# Patient Record
Sex: FEMALE | Race: WHITE | NOT HISPANIC OR LATINO | URBAN - METROPOLITAN AREA
[De-identification: names, ages, dates, MRNs, and addresses within clinical notes are randomized per-mention and may not be internally consistent; named-entity substitution may affect disease eponyms.]

---

## 2018-08-14 ENCOUNTER — APPOINTMENT (RX ONLY)
Dept: URBAN - METROPOLITAN AREA CLINIC 31 | Facility: CLINIC | Age: 73
Setting detail: DERMATOLOGY
End: 2018-08-14

## 2018-08-14 DIAGNOSIS — D485 NEOPLASM OF UNCERTAIN BEHAVIOR OF SKIN: ICD-10-CM

## 2018-08-14 DIAGNOSIS — L72.0 EPIDERMAL CYST: ICD-10-CM

## 2018-08-14 DIAGNOSIS — D18.0 HEMANGIOMA: ICD-10-CM

## 2018-08-14 DIAGNOSIS — Z71.89 OTHER SPECIFIED COUNSELING: ICD-10-CM

## 2018-08-14 DIAGNOSIS — L82.1 OTHER SEBORRHEIC KERATOSIS: ICD-10-CM

## 2018-08-14 DIAGNOSIS — D22 MELANOCYTIC NEVI: ICD-10-CM

## 2018-08-14 DIAGNOSIS — L81.4 OTHER MELANIN HYPERPIGMENTATION: ICD-10-CM

## 2018-08-14 PROBLEM — D22.5 MELANOCYTIC NEVI OF TRUNK: Status: ACTIVE | Noted: 2018-08-14

## 2018-08-14 PROBLEM — D18.01 HEMANGIOMA OF SKIN AND SUBCUTANEOUS TISSUE: Status: ACTIVE | Noted: 2018-08-14

## 2018-08-14 PROBLEM — D48.5 NEOPLASM OF UNCERTAIN BEHAVIOR OF SKIN: Status: ACTIVE | Noted: 2018-08-14

## 2018-08-14 PROCEDURE — ? COUNSELING

## 2018-08-14 PROCEDURE — ? LIQUID NITROGEN (COSMETIC)

## 2018-08-14 PROCEDURE — ? OBSERVATION AND MEASURE

## 2018-08-14 PROCEDURE — ? ADDITIONAL NOTES

## 2018-08-14 PROCEDURE — 99213 OFFICE O/P EST LOW 20 MIN: CPT

## 2018-08-14 ASSESSMENT — LOCATION SIMPLE DESCRIPTION DERM
LOCATION SIMPLE: LEFT UPPER BACK
LOCATION SIMPLE: LEFT SHOULDER
LOCATION SIMPLE: LEFT SUPERIOR EYELID
LOCATION SIMPLE: RIGHT SEPTUM
LOCATION SIMPLE: ABDOMEN

## 2018-08-14 ASSESSMENT — LOCATION DETAILED DESCRIPTION DERM
LOCATION DETAILED: RIGHT MID-ANTERIOR SEPTUM
LOCATION DETAILED: LEFT MEDIAL SUPERIOR EYELID
LOCATION DETAILED: LEFT MEDIAL UPPER BACK
LOCATION DETAILED: EPIGASTRIC SKIN
LOCATION DETAILED: LEFT RIB CAGE
LOCATION DETAILED: LEFT ANTERIOR SHOULDER

## 2018-08-14 ASSESSMENT — LOCATION ZONE DERM
LOCATION ZONE: ARM
LOCATION ZONE: NASAL_CAVITY
LOCATION ZONE: EYELID
LOCATION ZONE: TRUNK

## 2018-08-14 NOTE — PROCEDURE: LIQUID NITROGEN (COSMETIC)
Post-Care Instructions: I reviewed with the patient in detail post-care instructions. Patient is to wear sunprotection, and avoid picking at any of the treated lesions. Pt may apply Vaseline to crusted or scabbing areas.
Detail Level: Detailed
Consent: The patient's consent was obtained including but not limited to risks of crusting, scabbing, blistering, scarring, darker or lighter pigmentary change, recurrence, incomplete removal and infection. The patient understands that the procedure is cosmetic in nature and is not covered by insurance.
Render Post-Care Instructions In Note?: yes
Price (Use Numbers Only, No Special Characters Or $): 50.00

## 2018-08-14 NOTE — HPI: SKIN LESION
Is This A New Presentation, Or A Follow-Up?: Growths
How Severe Is Your Skin Lesion?: mild
Has Your Skin Lesion Been Treated?: not been treated
Is This A New Presentation, Or A Follow-Up?: Skin Lesion
Additional History: She feels like it is interferring with her breathing.  She has applied various ointments to it, including antibiotic ointment with no improvement.

## 2019-10-10 ENCOUNTER — APPOINTMENT (RX ONLY)
Dept: URBAN - METROPOLITAN AREA CLINIC 31 | Facility: CLINIC | Age: 74
Setting detail: DERMATOLOGY
End: 2019-10-10

## 2019-10-10 DIAGNOSIS — Z71.89 OTHER SPECIFIED COUNSELING: ICD-10-CM

## 2019-10-10 DIAGNOSIS — L57.8 OTHER SKIN CHANGES DUE TO CHRONIC EXPOSURE TO NONIONIZING RADIATION: ICD-10-CM

## 2019-10-10 DIAGNOSIS — L81.4 OTHER MELANIN HYPERPIGMENTATION: ICD-10-CM

## 2019-10-10 DIAGNOSIS — D18.0 HEMANGIOMA: ICD-10-CM

## 2019-10-10 DIAGNOSIS — D22 MELANOCYTIC NEVI: ICD-10-CM

## 2019-10-10 DIAGNOSIS — L82.1 OTHER SEBORRHEIC KERATOSIS: ICD-10-CM

## 2019-10-10 PROBLEM — D18.01 HEMANGIOMA OF SKIN AND SUBCUTANEOUS TISSUE: Status: ACTIVE | Noted: 2019-10-10

## 2019-10-10 PROBLEM — D48.5 NEOPLASM OF UNCERTAIN BEHAVIOR OF SKIN: Status: ACTIVE | Noted: 2019-10-10

## 2019-10-10 PROBLEM — D22.5 MELANOCYTIC NEVI OF TRUNK: Status: ACTIVE | Noted: 2019-10-10

## 2019-10-10 PROCEDURE — ? COUNSELING

## 2019-10-10 PROCEDURE — 11102 TANGNTL BX SKIN SINGLE LES: CPT

## 2019-10-10 PROCEDURE — ? BIOPSY BY SHAVE METHOD

## 2019-10-10 PROCEDURE — 99213 OFFICE O/P EST LOW 20 MIN: CPT | Mod: 25

## 2019-10-10 ASSESSMENT — LOCATION ZONE DERM
LOCATION ZONE: ARM
LOCATION ZONE: LEG
LOCATION ZONE: TRUNK

## 2019-10-10 ASSESSMENT — LOCATION DETAILED DESCRIPTION DERM
LOCATION DETAILED: RIGHT PROXIMAL POSTERIOR UPPER ARM
LOCATION DETAILED: LEFT MEDIAL UPPER BACK
LOCATION DETAILED: RIGHT ANTERIOR PROXIMAL THIGH
LOCATION DETAILED: LEFT PROXIMAL POSTERIOR UPPER ARM
LOCATION DETAILED: LEFT MEDIAL SUPERIOR CHEST
LOCATION DETAILED: LEFT RIB CAGE
LOCATION DETAILED: PERIUMBILICAL SKIN
LOCATION DETAILED: EPIGASTRIC SKIN
LOCATION DETAILED: RIGHT MEDIAL SUPERIOR CHEST

## 2019-10-10 ASSESSMENT — LOCATION SIMPLE DESCRIPTION DERM
LOCATION SIMPLE: LEFT POSTERIOR UPPER ARM
LOCATION SIMPLE: LEFT UPPER BACK
LOCATION SIMPLE: CHEST
LOCATION SIMPLE: ABDOMEN
LOCATION SIMPLE: RIGHT POSTERIOR UPPER ARM
LOCATION SIMPLE: RIGHT THIGH

## 2019-10-10 NOTE — HPI: FULL BODY SKIN EXAMINATION
How Severe Are Your Spot(S)?: mild
What Is The Reason For Today's Visit?: Full Body Skin Examination with No Concerns
What Is The Reason For Today's Visit? (Being Monitored For X): concerning skin lesions on an annual basis
Additional History: Patient has not noticed any changing moles or any tender or bleeding spots. Pt saw ENT for growth in her nose that was mentioned at her last office visit and was given a medication to treat the spot.

## 2019-10-10 NOTE — PROCEDURE: BIOPSY BY SHAVE METHOD
Electrodesiccation Text: The wound bed was treated with electrodesiccation after the biopsy was performed.
Type Of Destruction Used: Curettage
Bill For Surgical Tray: no
Was A Bandage Applied: Yes
Anesthesia Volume In Cc: 0.5
Notification Instructions: Patient will be notified of biopsy results. However, patient instructed to call the office if not contacted within 2 weeks.
Biopsy Method: Personna blade
Lab Facility: 
Dressing: bandage
Detail Level: Detailed
Cryotherapy Text: The wound bed was treated with cryotherapy after the biopsy was performed.
Post-Care Instructions: I reviewed with the patient in detail post-care instructions. Patient is to keep the biopsy site dry overnight, and then apply vaseline twice daily until healed.
Hemostasis: Drysol and Electrocautery
Lab: [id number]
Curettage Text: The wound bed was treated with curettage after the biopsy was performed.
Billing Type: Third-Party Bill
Size Of Lesion In Cm: 0.9
Silver Nitrate Text: The wound bed was treated with silver nitrate after the biopsy was performed.
Depth Of Biopsy: dermis
X Size Of Lesion In Cm: 0
Anesthesia Type: 1% lidocaine with epinephrine
Biopsy Type: H and E
Wound Care: Vaseline
Electrodesiccation And Curettage Text: The wound bed was treated with electrodesiccation and curettage after the biopsy was performed.
Consent: Written consent was obtained and risks were reviewed including but not limited to scarring, infection, bleeding, scabbing, incomplete removal, nerve damage and allergy to anesthesia.

## 2020-10-15 ENCOUNTER — APPOINTMENT (RX ONLY)
Dept: URBAN - METROPOLITAN AREA CLINIC 31 | Facility: CLINIC | Age: 75
Setting detail: DERMATOLOGY
End: 2020-10-15

## 2020-10-15 VITALS — TEMPERATURE: 97.7 F

## 2020-10-15 DIAGNOSIS — L81.4 OTHER MELANIN HYPERPIGMENTATION: ICD-10-CM

## 2020-10-15 DIAGNOSIS — D18.0 HEMANGIOMA: ICD-10-CM

## 2020-10-15 DIAGNOSIS — L82.1 OTHER SEBORRHEIC KERATOSIS: ICD-10-CM

## 2020-10-15 DIAGNOSIS — D22 MELANOCYTIC NEVI: ICD-10-CM

## 2020-10-15 DIAGNOSIS — Z71.89 OTHER SPECIFIED COUNSELING: ICD-10-CM

## 2020-10-15 PROBLEM — D48.5 NEOPLASM OF UNCERTAIN BEHAVIOR OF SKIN: Status: ACTIVE | Noted: 2020-10-15

## 2020-10-15 PROBLEM — D18.01 HEMANGIOMA OF SKIN AND SUBCUTANEOUS TISSUE: Status: ACTIVE | Noted: 2020-10-15

## 2020-10-15 PROBLEM — D22.5 MELANOCYTIC NEVI OF TRUNK: Status: ACTIVE | Noted: 2020-10-15

## 2020-10-15 PROCEDURE — ? BIOPSY BY SHAVE METHOD

## 2020-10-15 PROCEDURE — ? OBSERVATION AND MEASURE

## 2020-10-15 PROCEDURE — ? COUNSELING

## 2020-10-15 PROCEDURE — 11102 TANGNTL BX SKIN SINGLE LES: CPT

## 2020-10-15 PROCEDURE — 99213 OFFICE O/P EST LOW 20 MIN: CPT | Mod: 25

## 2020-10-15 PROCEDURE — ? ADDITIONAL NOTES

## 2020-10-15 ASSESSMENT — LOCATION SIMPLE DESCRIPTION DERM
LOCATION SIMPLE: RIGHT SHOULDER
LOCATION SIMPLE: LEFT SCALP
LOCATION SIMPLE: RIGHT UPPER ARM
LOCATION SIMPLE: ABDOMEN
LOCATION SIMPLE: LEFT UPPER BACK

## 2020-10-15 ASSESSMENT — LOCATION DETAILED DESCRIPTION DERM
LOCATION DETAILED: LEFT RIB CAGE
LOCATION DETAILED: EPIGASTRIC SKIN
LOCATION DETAILED: LEFT MEDIAL FRONTAL SCALP
LOCATION DETAILED: RIGHT ANTERIOR SHOULDER
LOCATION DETAILED: LEFT MEDIAL UPPER BACK
LOCATION DETAILED: RIGHT ANTERIOR PROXIMAL UPPER ARM

## 2020-10-15 ASSESSMENT — LOCATION ZONE DERM
LOCATION ZONE: ARM
LOCATION ZONE: SCALP
LOCATION ZONE: TRUNK

## 2020-10-15 NOTE — PROCEDURE: ADDITIONAL NOTES
Detail Level: Simple
Additional Notes: treated with LN2 per pt request to right clavicle spot on intake.

## 2020-10-15 NOTE — PROCEDURE: BIOPSY BY SHAVE METHOD
Detail Level: Detailed
Depth Of Biopsy: dermis
Was A Bandage Applied: Yes
Size Of Lesion In Cm: 1.7
X Size Of Lesion In Cm: 0
Biopsy Type: H and E
Biopsy Method: Personna blade
Anesthesia Type: 1% lidocaine with epinephrine
Anesthesia Volume In Cc: 0.5
Hemostasis: Drysol and Electrocautery
Wound Care: Vaseline
Dressing: bandage
Destruction After The Procedure: No
Type Of Destruction Used: Curettage
Curettage Text: The wound bed was treated with curettage after the biopsy was performed.
Cryotherapy Text: The wound bed was treated with cryotherapy after the biopsy was performed.
Electrodesiccation Text: The wound bed was treated with electrodesiccation after the biopsy was performed.
Electrodesiccation And Curettage Text: The wound bed was treated with electrodesiccation and curettage after the biopsy was performed.
Silver Nitrate Text: The wound bed was treated with silver nitrate after the biopsy was performed.
Lab: [id number]
Lab Facility: 
Consent: Written consent was obtained and risks were reviewed including but not limited to scarring, infection, bleeding, scabbing, incomplete removal, nerve damage and allergy to anesthesia.
Post-Care Instructions: I reviewed with the patient in detail post-care instructions. Patient is to keep the biopsy site dry overnight, and then apply vaseline twice daily until healed.
Notification Instructions: Patient will be notified of biopsy results. However, patient instructed to call the office if not contacted within 2 weeks.
Billing Type: Third-Party Bill
Information: Selecting Yes will display possible errors in your note based on the variables you have selected. This validation is only offered as a suggestion for you. PLEASE NOTE THAT THE VALIDATION TEXT WILL BE REMOVED WHEN YOU FINALIZE YOUR NOTE. IF YOU WANT TO FAX A PRELIMINARY NOTE YOU WILL NEED TO TOGGLE THIS TO 'NO' IF YOU DO NOT WANT IT IN YOUR FAXED NOTE.

## 2020-10-15 NOTE — HPI: FULL BODY SKIN EXAMINATION
How Severe Are Your Spot(S)?: mild
What Is The Reason For Today's Visit?: Full Body Skin Examination
What Is The Reason For Today's Visit? (Being Monitored For X): concerning skin lesions on an annual basis
Additional History: Patient denies any changing moles or any tender bleeding spots.

## 2021-01-18 ENCOUNTER — APPOINTMENT (RX ONLY)
Dept: URBAN - METROPOLITAN AREA CLINIC 31 | Facility: CLINIC | Age: 76
Setting detail: DERMATOLOGY
End: 2021-01-18

## 2021-01-18 VITALS — TEMPERATURE: 97.2 F

## 2021-01-18 DIAGNOSIS — L81.4 OTHER MELANIN HYPERPIGMENTATION: ICD-10-CM

## 2021-01-18 DIAGNOSIS — L82.1 OTHER SEBORRHEIC KERATOSIS: ICD-10-CM

## 2021-01-18 PROBLEM — D48.5 NEOPLASM OF UNCERTAIN BEHAVIOR OF SKIN: Status: ACTIVE | Noted: 2021-01-18

## 2021-01-18 PROCEDURE — 99212 OFFICE O/P EST SF 10 MIN: CPT | Mod: 25

## 2021-01-18 PROCEDURE — ? OBSERVATION AND MEASURE

## 2021-01-18 PROCEDURE — ? BIOPSY BY SHAVE METHOD

## 2021-01-18 PROCEDURE — 11102 TANGNTL BX SKIN SINGLE LES: CPT

## 2021-01-18 PROCEDURE — ? ADDITIONAL NOTES

## 2021-01-18 PROCEDURE — ? COUNSELING

## 2021-01-18 ASSESSMENT — LOCATION ZONE DERM
LOCATION ZONE: NECK
LOCATION ZONE: ARM

## 2021-01-18 ASSESSMENT — LOCATION DETAILED DESCRIPTION DERM
LOCATION DETAILED: RIGHT ANTERIOR PROXIMAL UPPER ARM
LOCATION DETAILED: LEFT CLAVICULAR NECK

## 2021-01-18 ASSESSMENT — LOCATION SIMPLE DESCRIPTION DERM
LOCATION SIMPLE: LEFT ANTERIOR NECK
LOCATION SIMPLE: RIGHT UPPER ARM

## 2021-01-18 NOTE — PROCEDURE: ADDITIONAL NOTES
Detail Level: Simple
Render Risk Assessment In Note?: no
Additional Notes: Spot mentioned on intake, patient advised it does not appear suspicious for skin cancer. return to clinic if changes or becomes bothersome.
Additional Notes: treated with LN2 per pt request

## 2021-01-18 NOTE — PROCEDURE: BIOPSY BY SHAVE METHOD
Detail Level: Detailed
Depth Of Biopsy: dermis
Was A Bandage Applied: Yes
Size Of Lesion In Cm: 1.2
X Size Of Lesion In Cm: 0
Biopsy Type: H and E
Biopsy Method: Personna blade
Anesthesia Type: 1% lidocaine with epinephrine
Anesthesia Volume In Cc: 0.5
Hemostasis: Drysol and Electrocautery
Wound Care: Vaseline
Dressing: bandage
Destruction After The Procedure: No
Type Of Destruction Used: Curettage
Curettage Text: The wound bed was treated with curettage after the biopsy was performed.
Cryotherapy Text: The wound bed was treated with cryotherapy after the biopsy was performed.
Electrodesiccation Text: The wound bed was treated with electrodesiccation after the biopsy was performed.
Electrodesiccation And Curettage Text: The wound bed was treated with electrodesiccation and curettage after the biopsy was performed.
Silver Nitrate Text: The wound bed was treated with silver nitrate after the biopsy was performed.
Lab: [id number]
Lab Facility: 
Consent: Written consent was obtained and risks were reviewed including but not limited to scarring, infection, bleeding, scabbing, incomplete removal, nerve damage and allergy to anesthesia.
Post-Care Instructions: I reviewed with the patient in detail post-care instructions. Patient is to keep the biopsy site dry overnight, and then apply vaseline twice daily until healed.
Notification Instructions: Patient will be notified of biopsy results. However, patient instructed to call the office if not contacted within 2 weeks.
Billing Type: Third-Party Bill
Information: Selecting Yes will display possible errors in your note based on the variables you have selected. This validation is only offered as a suggestion for you. PLEASE NOTE THAT THE VALIDATION TEXT WILL BE REMOVED WHEN YOU FINALIZE YOUR NOTE. IF YOU WANT TO FAX A PRELIMINARY NOTE YOU WILL NEED TO TOGGLE THIS TO 'NO' IF YOU DO NOT WANT IT IN YOUR FAXED NOTE.

## 2021-07-26 ENCOUNTER — APPOINTMENT (RX ONLY)
Dept: URBAN - METROPOLITAN AREA CLINIC 31 | Facility: CLINIC | Age: 76
Setting detail: DERMATOLOGY
End: 2021-07-26

## 2021-07-26 DIAGNOSIS — L72.0 EPIDERMAL CYST: ICD-10-CM

## 2021-07-26 DIAGNOSIS — L82.1 OTHER SEBORRHEIC KERATOSIS: ICD-10-CM

## 2021-07-26 PROCEDURE — ? COUNSELING

## 2021-07-26 PROCEDURE — ? DEFER

## 2021-07-26 PROCEDURE — ? OBSERVATION AND MEASURE

## 2021-07-26 PROCEDURE — ? PHOTO-DOCUMENTATION

## 2021-07-26 PROCEDURE — ? ADDITIONAL NOTES

## 2021-07-26 PROCEDURE — 99213 OFFICE O/P EST LOW 20 MIN: CPT

## 2021-07-26 ASSESSMENT — LOCATION ZONE DERM: LOCATION ZONE: TRUNK

## 2021-07-26 ASSESSMENT — LOCATION DETAILED DESCRIPTION DERM
LOCATION DETAILED: INFERIOR THORACIC SPINE
LOCATION DETAILED: RIGHT MID-UPPER BACK

## 2021-07-26 ASSESSMENT — LOCATION SIMPLE DESCRIPTION DERM
LOCATION SIMPLE: RIGHT UPPER BACK
LOCATION SIMPLE: UPPER BACK

## 2021-07-26 NOTE — HPI: SKIN LESION
Is This A New Presentation, Or A Follow-Up?: Growth
What Type Of Note Output Would You Prefer (Optional)?: Standard Output
How Severe Is Your Skin Lesion?: mild
Has Your Skin Lesion Been Treated?: been treated

## 2021-07-26 NOTE — PROCEDURE: ADDITIONAL NOTES
Render Risk Assessment In Note?: no
Detail Level: Detailed
Additional Notes: Discussed in detail benign diagnosis and treatments such as removal by excision. \\nWill start prior authorization for excision. \\nWill call patient when ready to schedule. \\nDiscussed treatment and risks with patient.

## 2021-08-03 ENCOUNTER — APPOINTMENT (RX ONLY)
Dept: URBAN - METROPOLITAN AREA CLINIC 31 | Facility: CLINIC | Age: 76
Setting detail: DERMATOLOGY
End: 2021-08-03

## 2021-08-03 DIAGNOSIS — D17 BENIGN LIPOMATOUS NEOPLASM: ICD-10-CM

## 2021-08-03 PROBLEM — D17.1 BENIGN LIPOMATOUS NEOPLASM OF SKIN AND SUBCUTANEOUS TISSUE OF TRUNK: Status: ACTIVE | Noted: 2021-08-03

## 2021-08-03 PROCEDURE — 11402 EXC TR-EXT B9+MARG 1.1-2 CM: CPT

## 2021-08-03 PROCEDURE — ? EXCISION

## 2021-08-03 PROCEDURE — 13100 CMPLX RPR TRUNK 1.1-2.5 CM: CPT

## 2021-08-03 ASSESSMENT — LOCATION ZONE DERM: LOCATION ZONE: TRUNK

## 2021-08-03 ASSESSMENT — LOCATION DETAILED DESCRIPTION DERM: LOCATION DETAILED: RIGHT MID-UPPER BACK

## 2021-08-03 ASSESSMENT — LOCATION SIMPLE DESCRIPTION DERM: LOCATION SIMPLE: RIGHT UPPER BACK

## 2021-08-03 NOTE — PROCEDURE: EXCISION
Complex Repair And Double Advancement Flap Text: The defect edges were debeveled with a #15 scalpel blade.  The primary defect was closed partially with a complex linear closure.  Given the location of the remaining defect, shape of the defect and the proximity to free margins a double advancement flap was deemed most appropriate for complete closure of the defect.  Using a sterile surgical marker, an appropriate advancement flap was drawn incorporating the defect and placing the expected incisions within the relaxed skin tension lines where possible.    The area thus outlined was incised deep to adipose tissue with a #15 scalpel blade.  The skin margins were undermined to an appropriate distance in all directions utilizing iris scissors.
Saucerization Excision Additional Text (Leave Blank If You Do Not Want): The margin was drawn around the clinically apparent lesion.  Incisions were then made along these lines, in a tangential fashion, to the appropriate tissue plane and the lesion was extirpated.
H Plasty Text: Given the location of the defect, shape of the defect and the proximity to free margins a H-plasty was deemed most appropriate for repair.  Using a sterile surgical marker, the appropriate advancement arms of the H-plasty were drawn incorporating the defect and placing the expected incisions within the relaxed skin tension lines where possible. The area thus outlined was incised deep to adipose tissue with a #15 scalpel blade. The skin margins were undermined to an appropriate distance in all directions utilizing iris scissors.  The opposing advancement arms were then advanced into place in opposite direction and anchored with interrupted buried subcutaneous sutures.
Show Asc Variables: Yes
Rhombic Flap Text: The defect edges were debeveled with a #15 scalpel blade.  Given the location of the defect and the proximity to free margins a rhombic flap was deemed most appropriate.  Using a sterile surgical marker, an appropriate rhombic flap was drawn incorporating the defect.    The area thus outlined was incised deep to adipose tissue with a #15 scalpel blade.  The skin margins were undermined to an appropriate distance in all directions utilizing iris scissors.
Validate Referring Provider (Can Hide Referring Provider In Settings Tab): No
Complex Repair And O-T Advancement Flap Text: The defect edges were debeveled with a #15 scalpel blade.  The primary defect was closed partially with a complex linear closure.  Given the location of the remaining defect, shape of the defect and the proximity to free margins an O-T advancement flap was deemed most appropriate for complete closure of the defect.  Using a sterile surgical marker, an appropriate advancement flap was drawn incorporating the defect and placing the expected incisions within the relaxed skin tension lines where possible.    The area thus outlined was incised deep to adipose tissue with a #15 scalpel blade.  The skin margins were undermined to an appropriate distance in all directions utilizing iris scissors.
Melolabial Transposition Flap Text: The defect edges were debeveled with a #15 scalpel blade.  Given the location of the defect and the proximity to free margins a melolabial flap was deemed most appropriate.  Using a sterile surgical marker, an appropriate melolabial transposition flap was drawn incorporating the defect.    The area thus outlined was incised deep to adipose tissue with a #15 scalpel blade.  The skin margins were undermined to an appropriate distance in all directions utilizing iris scissors.
V-Y Flap Text: The defect edges were debeveled with a #15 scalpel blade.  Given the location of the defect, shape of the defect and the proximity to free margins a V-Y flap was deemed most appropriate.  Using a sterile surgical marker, an appropriate advancement flap was drawn incorporating the defect and placing the expected incisions within the relaxed skin tension lines where possible.    The area thus outlined was incised deep to adipose tissue with a #15 scalpel blade.  The skin margins were undermined to an appropriate distance in all directions utilizing iris scissors.
Secondary Defect Width (In Cm): 0
Epidermal Sutures: 4-0 Surgipro
Rhomboid Transposition Flap Text: The defect edges were debeveled with a #15 scalpel blade.  Given the location of the defect and the proximity to free margins a rhomboid transposition flap was deemed most appropriate.  Using a sterile surgical marker, an appropriate rhomboid flap was drawn incorporating the defect.    The area thus outlined was incised deep to adipose tissue with a #15 scalpel blade.  The skin margins were undermined to an appropriate distance in all directions utilizing iris scissors.
Burow's Advancement Flap Text: The defect edges were debeveled with a #15 scalpel blade.  Given the location of the defect and the proximity to free margins a Burow's advancement flap was deemed most appropriate.  Using a sterile surgical marker, the appropriate advancement flap was drawn incorporating the defect and placing the expected incisions within the relaxed skin tension lines where possible.    The area thus outlined was incised deep to adipose tissue with a #15 scalpel blade.  The skin margins were undermined to an appropriate distance in all directions utilizing iris scissors.
Epidermal Closure Graft Donor Site (Optional): simple interrupted
A-T Advancement Flap Text: The defect edges were debeveled with a #15 scalpel blade.  Given the location of the defect, shape of the defect and the proximity to free margins an A-T advancement flap was deemed most appropriate.  Using a sterile surgical marker, an appropriate advancement flap was drawn incorporating the defect and placing the expected incisions within the relaxed skin tension lines where possible.    The area thus outlined was incised deep to adipose tissue with a #15 scalpel blade.  The skin margins were undermined to an appropriate distance in all directions utilizing iris scissors.
Size Of Lesion In Cm: 1.5
Ear Star Wedge Flap Text: The defect edges were debeveled with a #15 blade scalpel.  Given the location of the defect and the proximity to free margins (helical rim) an ear star wedge flap was deemed most appropriate.  Using a sterile surgical marker, the appropriate flap was drawn incorporating the defect and placing the expected incisions between the helical rim and antihelix where possible.  The area thus outlined was incised through and through with a #15 scalpel blade.
Complex Repair And A-T Advancement Flap Text: The defect edges were debeveled with a #15 scalpel blade.  The primary defect was closed partially with a complex linear closure.  Given the location of the remaining defect, shape of the defect and the proximity to free margins an A-T advancement flap was deemed most appropriate for complete closure of the defect.  Using a sterile surgical marker, an appropriate advancement flap was drawn incorporating the defect and placing the expected incisions within the relaxed skin tension lines where possible.    The area thus outlined was incised deep to adipose tissue with a #15 scalpel blade.  The skin margins were undermined to an appropriate distance in all directions utilizing iris scissors.
O-T Advancement Flap Text: The defect edges were debeveled with a #15 scalpel blade.  Given the location of the defect, shape of the defect and the proximity to free margins an O-T advancement flap was deemed most appropriate.  Using a sterile surgical marker, an appropriate advancement flap was drawn incorporating the defect and placing the expected incisions within the relaxed skin tension lines where possible.    The area thus outlined was incised deep to adipose tissue with a #15 scalpel blade.  The skin margins were undermined to an appropriate distance in all directions utilizing iris scissors.
Suture Removal: 14 days
Hemigard Retention Suture: 0-0 Nylon
Complex Repair And Epidermal Autograft Text: The defect edges were debeveled with a #15 scalpel blade.  The primary defect was closed partially with a complex linear closure.  Given the location of the defect, shape of the defect and the proximity to free margins an epidermal autograft was deemed most appropriate to repair the remaining defect.  The graft was trimmed to fit the size of the remaining defect.  The graft was then placed in the primary defect, oriented appropriately, and sutured into place.
M-Plasty Intermediate Repair Preamble Text (Leave Blank If You Do Not Want): Undermining was performed with blunt dissection.
Hatchet Flap Text: The defect edges were debeveled with a #15 scalpel blade.  Given the location of the defect, shape of the defect and the proximity to free margins a hatchet flap was deemed most appropriate.  Using a sterile surgical marker, an appropriate hatchet flap was drawn incorporating the defect and placing the expected incisions within the relaxed skin tension lines where possible.    The area thus outlined was incised deep to adipose tissue with a #15 scalpel blade.  The skin margins were undermined to an appropriate distance in all directions utilizing iris scissors.
Vermilion Border Text: The closure involved the vermilion border.
Complex Repair And Xenograft Text: The defect edges were debeveled with a #15 scalpel blade.  The primary defect was closed partially with a complex linear closure.  Given the location of the defect, shape of the defect and the proximity to free margins a xenograft was deemed most appropriate to repair the remaining defect.  The graft was trimmed to fit the size of the remaining defect.  The graft was then placed in the primary defect, oriented appropriately, and sutured into place.
Bilobed Transposition Flap Text: The defect edges were debeveled with a #15 scalpel blade.  Given the location of the defect and the proximity to free margins a bilobed transposition flap was deemed most appropriate.  Using a sterile surgical marker, an appropriate bilobe flap drawn around the defect.    The area thus outlined was incised deep to adipose tissue with a #15 scalpel blade.  The skin margins were undermined to an appropriate distance in all directions utilizing iris scissors.
Island Pedicle Flap Text: The defect edges were debeveled with a #15 scalpel blade.  Given the location of the defect, shape of the defect and the proximity to free margins an island pedicle advancement flap was deemed most appropriate.  Using a sterile surgical marker, an appropriate advancement flap was drawn incorporating the defect, outlining the appropriate donor tissue and placing the expected incisions within the relaxed skin tension lines where possible.    The area thus outlined was incised deep to adipose tissue with a #15 scalpel blade.  The skin margins were undermined to an appropriate distance in all directions around the primary defect and laterally outward around the island pedicle utilizing iris scissors.  There was minimal undermining beneath the pedicle flap.
Cartilage Graft Text: The defect edges were debeveled with a #15 scalpel blade.  Given the location of the defect, shape of the defect, the fact the defect involved a full thickness cartilage defect a cartilage graft was deemed most appropriate.  An appropriate donor site was identified, cleansed, and anesthetized. The cartilage graft was then harvested and transferred to the recipient site, oriented appropriately and then sutured into place.  The secondary defect was then repaired using a primary closure.
Complex Repair And Double M Plasty Text: The defect edges were debeveled with a #15 scalpel blade.  The primary defect was closed partially with a complex linear closure.  Given the location of the remaining defect, shape of the defect and the proximity to free margins a double M plasty was deemed most appropriate for complete closure of the defect.  Using a sterile surgical marker, an appropriate advancement flap was drawn incorporating the defect and placing the expected incisions within the relaxed skin tension lines where possible.    The area thus outlined was incised deep to adipose tissue with a #15 scalpel blade.  The skin margins were undermined to an appropriate distance in all directions utilizing iris scissors.
Complex Repair And Melolabial Flap Text: The defect edges were debeveled with a #15 scalpel blade.  The primary defect was closed partially with a complex linear closure.  Given the location of the remaining defect, shape of the defect and the proximity to free margins a melolabial flap was deemed most appropriate for complete closure of the defect.  Using a sterile surgical marker, an appropriate advancement flap was drawn incorporating the defect and placing the expected incisions within the relaxed skin tension lines where possible.    The area thus outlined was incised deep to adipose tissue with a #15 scalpel blade.  The skin margins were undermined to an appropriate distance in all directions utilizing iris scissors.
Helical Rim Advancement Flap Text: The defect edges were debeveled with a #15 blade scalpel.  Given the location of the defect and the proximity to free margins (helical rim) a double helical rim advancement flap was deemed most appropriate.  Using a sterile surgical marker, the appropriate advancement flaps were drawn incorporating the defect and placing the expected incisions between the helical rim and antihelix where possible.  The area thus outlined was incised through and through with a #15 scalpel blade.  With a skin hook and iris scissors, the flaps were gently and sharply undermined and freed up.
Referring Physician (Optional): Fred Olvera
Complex Repair And O-L Flap Text: The defect edges were debeveled with a #15 scalpel blade.  The primary defect was closed partially with a complex linear closure.  Given the location of the remaining defect, shape of the defect and the proximity to free margins an O-L flap was deemed most appropriate for complete closure of the defect.  Using a sterile surgical marker, an appropriate flap was drawn incorporating the defect and placing the expected incisions within the relaxed skin tension lines where possible.    The area thus outlined was incised deep to adipose tissue with a #15 scalpel blade.  The skin margins were undermined to an appropriate distance in all directions utilizing iris scissors.
Dorsal Nasal Flap Text: The defect edges were debeveled with a #15 scalpel blade.  Given the location of the defect and the proximity to free margins a dorsal nasal flap was deemed most appropriate.  Using a sterile surgical marker, an appropriate dorsal nasal flap was drawn around the defect.    The area thus outlined was incised deep to adipose tissue with a #15 scalpel blade.  The skin margins were undermined to an appropriate distance in all directions utilizing iris scissors.
Lazy S Complex Repair Preamble Text (Leave Blank If You Do Not Want): Extensive wide undermining was performed.
Perilesional Excision Additional Text (Leave Blank If You Do Not Want): The margin was drawn around the clinically apparent lesion. Incisions were then made along these lines to the appropriate tissue plane and the lesion was extirpated.
Where Do You Want The Question To Include Opioid Counseling Located?: Case Summary Tab
Medical Necessity Information: It is in your best interest to select a reason for this procedure from the list below. All of these items fulfill various CMS LCD requirements except lesion extends to a margin.
Island Pedicle Flap With Canthal Suspension Text: The defect edges were debeveled with a #15 scalpel blade.  Given the location of the defect, shape of the defect and the proximity to free margins an island pedicle advancement flap was deemed most appropriate.  Using a sterile surgical marker, an appropriate advancement flap was drawn incorporating the defect, outlining the appropriate donor tissue and placing the expected incisions within the relaxed skin tension lines where possible. The area thus outlined was incised deep to adipose tissue with a #15 scalpel blade.  The skin margins were undermined to an appropriate distance in all directions around the primary defect and laterally outward around the island pedicle utilizing iris scissors.  There was minimal undermining beneath the pedicle flap. A suspension suture was placed in the canthal tendon to prevent tension and prevent ectropion.
Ftsg Text: The defect edges were debeveled with a #15 scalpel blade.  Given the location of the defect, shape of the defect and the proximity to free margins a full thickness skin graft was deemed most appropriate.  Using a sterile surgical marker, the primary defect shape was transferred to the donor site. The area thus outlined was incised deep to adipose tissue with a #15 scalpel blade.  The harvested graft was then trimmed of adipose tissue until only dermis and epidermis was left.  The skin margins of the secondary defect were undermined to an appropriate distance in all directions utilizing iris scissors.  The secondary defect was closed with interrupted buried subcutaneous sutures.  The skin edges were then re-apposed with running  sutures.  The skin graft was then placed in the primary defect and oriented appropriately.
Banner Transposition Flap Text: The defect edges were debeveled with a #15 scalpel blade.  Given the location of the defect and the proximity to free margins a Banner transposition flap was deemed most appropriate.  Using a sterile surgical marker, an appropriate flap drawn around the defect. The area thus outlined was incised deep to adipose tissue with a #15 scalpel blade.  The skin margins were undermined to an appropriate distance in all directions utilizing iris scissors.
Anesthesia Volume In Cc: 4
Hemostasis: Electrocautery
Retention Suture Bite Size: 1 mm
Repair Performed By Another Provider Text (Leave Blank If You Do Not Want): After the tissue was excised the defect was repaired by another provider.
Anesthesia Type: 0.5% lidocaine with 1:200,000 epinephrine and a 1:10 solution of 8.4% sodium bicarbonate
Double O-Z Plasty Text: The defect edges were debeveled with a #15 scalpel blade.  Given the location of the defect, shape of the defect and the proximity to free margins a Double O-Z plasty (double transposition flap) was deemed most appropriate.  Using a sterile surgical marker, the appropriate transposition flaps were drawn incorporating the defect and placing the expected incisions within the relaxed skin tension lines where possible. The area thus outlined was incised deep to adipose tissue with a #15 scalpel blade.  The skin margins were undermined to an appropriate distance in all directions utilizing iris scissors.  Hemostasis was achieved with electrocautery.  The flaps were then transposed into place, one clockwise and the other counterclockwise, and anchored with interrupted buried subcutaneous sutures.
Graft Donor Site Bandage (Optional-Leave Blank If You Don't Want In Note): Steri-strips and a pressure bandage were applied to the donor site.
Complex Repair And Bilobe Flap Text: The defect edges were debeveled with a #15 scalpel blade.  The primary defect was closed partially with a complex linear closure.  Given the location of the remaining defect, shape of the defect and the proximity to free margins a bilobe flap was deemed most appropriate for complete closure of the defect.  Using a sterile surgical marker, an appropriate advancement flap was drawn incorporating the defect and placing the expected incisions within the relaxed skin tension lines where possible.    The area thus outlined was incised deep to adipose tissue with a #15 scalpel blade.  The skin margins were undermined to an appropriate distance in all directions utilizing iris scissors.
Mastoid Interpolation Flap Text: A decision was made to reconstruct the defect utilizing an interpolation axial flap and a staged reconstruction.  A telfa template was made of the defect.  This telfa template was then used to outline the mastoid interpolation flap.  The donor area for the pedicle flap was then injected with anesthesia.  The flap was excised through the skin and subcutaneous tissue down to the layer of the underlying musculature.  The pedicle flap was carefully excised within this deep plane to maintain its blood supply.  The edges of the donor site were undermined.   The donor site was closed in a primary fashion.  The pedicle was then rotated into position and sutured.  Once the tube was sutured into place, adequate blood supply was confirmed with blanching and refill.  The pedicle was then wrapped with xeroform gauze and dressed appropriately with a telfa and gauze bandage to ensure continued blood supply and protect the attached pedicle.
Tissue Cultured Epidermal Autograft Text: The defect edges were debeveled with a #15 scalpel blade.  Given the location of the defect, shape of the defect and the proximity to free margins a tissue cultured epidermal autograft was deemed most appropriate.  The graft was then trimmed to fit the size of the defect.  The graft was then placed in the primary defect and oriented appropriately.
Complex Repair And Modified Advancement Flap Text: The defect edges were debeveled with a #15 scalpel blade.  The primary defect was closed partially with a complex linear closure.  Given the location of the remaining defect, shape of the defect and the proximity to free margins a modified advancement flap was deemed most appropriate for complete closure of the defect.  Using a sterile surgical marker, an appropriate advancement flap was drawn incorporating the defect and placing the expected incisions within the relaxed skin tension lines where possible.    The area thus outlined was incised deep to adipose tissue with a #15 scalpel blade.  The skin margins were undermined to an appropriate distance in all directions utilizing iris scissors.
Dressing: dry sterile dressing
Chonodrocutaneous Helical Advancement Flap Text: The defect edges were debeveled with a #15 scalpel blade.  Given the location of the defect and the proximity to free margins a chondrocutaneous helical advancement flap was deemed most appropriate.  Using a sterile surgical marker, the appropriate advancement flap was drawn incorporating the defect and placing the expected incisions within the relaxed skin tension lines where possible.    The area thus outlined was incised deep to adipose tissue with a #15 scalpel blade.  The skin margins were undermined to an appropriate distance in all directions utilizing iris scissors.
Excisional Biopsy Additional Text (Leave Blank If You Do Not Want): The margin was drawn around the clinically apparent lesion. An elliptical shape was then drawn on the skin incorporating the lesion and margins.  Incisions were then made along these lines to the appropriate tissue plane and the lesion was extirpated.
V-Y Plasty Text: The defect edges were debeveled with a #15 scalpel blade.  Given the location of the defect, shape of the defect and the proximity to free margins an V-Y advancement flap was deemed most appropriate.  Using a sterile surgical marker, an appropriate advancement flap was drawn incorporating the defect and placing the expected incisions within the relaxed skin tension lines where possible.    The area thus outlined was incised deep to adipose tissue with a #15 scalpel blade.  The skin margins were undermined to an appropriate distance in all directions utilizing iris scissors.
Consent was obtained from the patient. The risks and benefits to therapy were discussed in detail. Specifically, the risks of infection, scarring, bleeding, prolonged wound healing, incomplete removal, allergy to anesthesia, nerve injury and recurrence were addressed. Prior to the procedure, the treatment site was clearly identified and confirmed by the patient. All components of Universal Protocol/PAUSE Rule completed.
Complex Repair And Burow's Graft Text: The defect edges were debeveled with a #15 scalpel blade.  The primary defect was closed partially with a complex linear closure.  Given the location of the defect, shape of the defect, the proximity to free margins and the presence of a standing cone deformity a Burow's graft was deemed most appropriate to repair the remaining defect.  The graft was trimmed to fit the size of the remaining defect.  The graft was then placed in the primary defect, oriented appropriately, and sutured into place.
Information: Selecting Yes will display possible errors in your note based on the variables you have selected. This validation is only offered as a suggestion for you. PLEASE NOTE THAT THE VALIDATION TEXT WILL BE REMOVED WHEN YOU FINALIZE YOUR NOTE. IF YOU WANT TO FAX A PRELIMINARY NOTE YOU WILL NEED TO TOGGLE THIS TO 'NO' IF YOU DO NOT WANT IT IN YOUR FAXED NOTE.
Lab: [id number]
Complex Repair And Split-Thickness Skin Graft Text: The defect edges were debeveled with a #15 scalpel blade.  The primary defect was closed partially with a complex linear closure.  Given the location of the defect, shape of the defect and the proximity to free margins a split thickness skin graft was deemed most appropriate to repair the remaining defect.  The graft was trimmed to fit the size of the remaining defect.  The graft was then placed in the primary defect, oriented appropriately, and sutured into place.
Suturegard Intro: Intraoperative tissue expansion was performed, utilizing the SUTUREGARD device, in order to reduce wound tension.
Orbicularis Oris Muscle Flap Text: The defect edges were debeveled with a #15 scalpel blade.  Given that the defect affected the competency of the oral sphincter an obicularis oris muscle flap was deemed most appropriate to restore this competency and normal muscle function.  Using a sterile surgical marker, an appropriate flap was drawn incorporating the defect. The area thus outlined was incised with a #15 scalpel blade.
Modified Advancement Flap Text: The defect edges were debeveled with a #15 scalpel blade.  Given the location of the defect, shape of the defect and the proximity to free margins a modified advancement flap was deemed most appropriate.  Using a sterile surgical marker, an appropriate advancement flap was drawn incorporating the defect and placing the expected incisions within the relaxed skin tension lines where possible.    The area thus outlined was incised deep to adipose tissue with a #15 scalpel blade.  The skin margins were undermined to an appropriate distance in all directions utilizing iris scissors.
Fusiform Excision Additional Text (Leave Blank If You Do Not Want): The margin was drawn around the clinically apparent lesion.  A fusiform shape was then drawn on the skin incorporating the lesion and margins.  Incisions were then made along these lines to the appropriate tissue plane and the lesion was extirpated.
Alar Island Pedicle Flap Text: The defect edges were debeveled with a #15 scalpel blade.  Given the location of the defect, shape of the defect and the proximity to the alar rim an island pedicle advancement flap was deemed most appropriate.  Using a sterile surgical marker, an appropriate advancement flap was drawn incorporating the defect, outlining the appropriate donor tissue and placing the expected incisions within the nasal ala running parallel to the alar rim. The area thus outlined was incised with a #15 scalpel blade.  The skin margins were undermined minimally to an appropriate distance in all directions around the primary defect and laterally outward around the island pedicle utilizing iris scissors.  There was minimal undermining beneath the pedicle flap.
Complex Repair And Skin Substitute Graft Text: The defect edges were debeveled with a #15 scalpel blade.  The primary defect was closed partially with a complex linear closure.  Given the location of the remaining defect, shape of the defect and the proximity to free margins a skin substitute graft was deemed most appropriate to repair the remaining defect.  The graft was trimmed to fit the size of the remaining defect.  The graft was then placed in the primary defect, oriented appropriately, and sutured into place.
Crescentic Advancement Flap Text: The defect edges were debeveled with a #15 scalpel blade.  Given the location of the defect and the proximity to free margins a crescentic advancement flap was deemed most appropriate.  Using a sterile surgical marker, the appropriate advancement flap was drawn incorporating the defect and placing the expected incisions within the relaxed skin tension lines where possible.    The area thus outlined was incised deep to adipose tissue with a #15 scalpel blade.  The skin margins were undermined to an appropriate distance in all directions utilizing iris scissors.
Complex Repair And Transposition Flap Text: The defect edges were debeveled with a #15 scalpel blade.  The primary defect was closed partially with a complex linear closure.  Given the location of the remaining defect, shape of the defect and the proximity to free margins a transposition flap was deemed most appropriate for complete closure of the defect.  Using a sterile surgical marker, an appropriate advancement flap was drawn incorporating the defect and placing the expected incisions within the relaxed skin tension lines where possible.    The area thus outlined was incised deep to adipose tissue with a #15 scalpel blade.  The skin margins were undermined to an appropriate distance in all directions utilizing iris scissors.
Bilobed Flap Text: The defect edges were debeveled with a #15 scalpel blade.  Given the location of the defect and the proximity to free margins a bilobe flap was deemed most appropriate.  Using a sterile surgical marker, an appropriate bilobe flap drawn around the defect.    The area thus outlined was incised deep to adipose tissue with a #15 scalpel blade.  The skin margins were undermined to an appropriate distance in all directions utilizing iris scissors.
Retention Suture Text: Retention sutures were placed to support the closure and prevent dehiscence.
Excision Method: Slit
Epidermal Closure: running cuticular
Wound Care: Petrolatum
Paramedian Forehead Flap Text: A decision was made to reconstruct the defect utilizing an interpolation axial flap and a staged reconstruction.  A telfa template was made of the defect.  This telfa template was then used to outline the paramedian forehead pedicle flap.  The donor area for the pedicle flap was then injected with anesthesia.  The flap was excised through the skin and subcutaneous tissue down to the layer of the underlying musculature.  The pedicle flap was carefully excised within this deep plane to maintain its blood supply.  The edges of the donor site were undermined.   The donor site was closed in a primary fashion.  The pedicle was then rotated into position and sutured.  Once the tube was sutured into place, adequate blood supply was confirmed with blanching and refill.  The pedicle was then wrapped with xeroform gauze and dressed appropriately with a telfa and gauze bandage to ensure continued blood supply and protect the attached pedicle.
Complex Repair And W Plasty Text: The defect edges were debeveled with a #15 scalpel blade.  The primary defect was closed partially with a complex linear closure.  Given the location of the remaining defect, shape of the defect and the proximity to free margins a W plasty was deemed most appropriate for complete closure of the defect.  Using a sterile surgical marker, an appropriate advancement flap was drawn incorporating the defect and placing the expected incisions within the relaxed skin tension lines where possible.    The area thus outlined was incised deep to adipose tissue with a #15 scalpel blade.  The skin margins were undermined to an appropriate distance in all directions utilizing iris scissors.
No Repair - Repaired With Adjacent Surgical Defect Text (Leave Blank If You Do Not Want): After the excision the defect was repaired concurrently with another surgical defect which was in close approximation.
Nasalis-Muscle-Based Myocutaneous Island Pedicle Flap Text: Using a #15 blade, an incision was made around the donor flap to the level of the nasalis muscle. Wide lateral undermining was then performed in both the subcutaneous plane above the nasalis muscle, and in a submuscular plane just above periosteum. This allowed the formation of a free nasalis muscle axial pedicle (based on the angular artery) which was still attached to the actual cutaneous flap, increasing its mobility and vascular viability. Hemostasis was obtained with pinpoint electrocoagulation. The flap was mobilized into position and the pivotal anchor points positioned and stabilized with buried interrupted sutures. Subcutaneous and dermal tissues were closed in a multilayered fashion with sutures. Tissue redundancies were excised, and the epidermal edges were apposed without significant tension and sutured with sutures.
O-Z Flap Text: The defect edges were debeveled with a #15 scalpel blade.  Given the location of the defect, shape of the defect and the proximity to free margins an O-Z flap was deemed most appropriate.  Using a sterile surgical marker, an appropriate transposition flap was drawn incorporating the defect and placing the expected incisions within the relaxed skin tension lines where possible. The area thus outlined was incised deep to adipose tissue with a #15 scalpel blade.  The skin margins were undermined to an appropriate distance in all directions utilizing iris scissors.
W Plasty Text: The lesion was extirpated to the level of the fat with a #15 scalpel blade.  Given the location of the defect, shape of the defect and the proximity to free margins a W-plasty was deemed most appropriate for repair.  Using a sterile surgical marker, the appropriate transposition arms of the W-plasty were drawn incorporating the defect and placing the expected incisions within the relaxed skin tension lines where possible.    The area thus outlined was incised deep to adipose tissue with a #15 scalpel blade.  The skin margins were undermined to an appropriate distance in all directions utilizing iris scissors.  The opposing transposition arms were then transposed into place in opposite direction and anchored with interrupted buried subcutaneous sutures.
Rotation Flap Text: The defect edges were debeveled with a #15 scalpel blade.  Given the location of the defect, shape of the defect and the proximity to free margins a rotation flap was deemed most appropriate.  Using a sterile surgical marker, an appropriate rotation flap was drawn incorporating the defect and placing the expected incisions within the relaxed skin tension lines where possible.    The area thus outlined was incised deep to adipose tissue with a #15 scalpel blade.  The skin margins were undermined to an appropriate distance in all directions utilizing iris scissors.
Nostril Rim Text: The closure involved the nostril rim.
Trilobed Flap Text: The defect edges were debeveled with a #15 scalpel blade.  Given the location of the defect and the proximity to free margins a trilobed flap was deemed most appropriate.  Using a sterile surgical marker, an appropriate trilobed flap drawn around the defect.    The area thus outlined was incised deep to adipose tissue with a #15 scalpel blade.  The skin margins were undermined to an appropriate distance in all directions utilizing iris scissors.
Biopsy Photograph Reviewed: No (no photograph available)
Detail Level: Detailed
Deep Sutures: 4-0 PDO
Number Of Hemigard Strips Per Side: 1
Purse String (Simple) Text: Given the location of the defect and the characteristics of the surrounding skin a purse string simple closure was deemed most appropriate.  Undermining was performed circumferentially around the surgical defect.  A purse string suture was then placed and tightened.
Debridement Text: The wound edges were debrided prior to proceeding with the closure to facilitate wound healing.
Complex Repair And Rhombic Flap Text: The defect edges were debeveled with a #15 scalpel blade.  The primary defect was closed partially with a complex linear closure.  Given the location of the remaining defect, shape of the defect and the proximity to free margins a rhombic flap was deemed most appropriate for complete closure of the defect.  Using a sterile surgical marker, an appropriate advancement flap was drawn incorporating the defect and placing the expected incisions within the relaxed skin tension lines where possible.    The area thus outlined was incised deep to adipose tissue with a #15 scalpel blade.  The skin margins were undermined to an appropriate distance in all directions utilizing iris scissors.
Muscle Hinge Flap Text: The defect edges were debeveled with a #15 scalpel blade.  Given the size, depth and location of the defect and the proximity to free margins a muscle hinge flap was deemed most appropriate.  Using a sterile surgical marker, an appropriate hinge flap was drawn incorporating the defect. The area thus outlined was incised with a #15 scalpel blade.  The skin margins were undermined to an appropriate distance in all directions utilizing iris scissors.
Bi-Rhombic Flap Text: The defect edges were debeveled with a #15 scalpel blade.  Given the location of the defect and the proximity to free margins a bi-rhombic flap was deemed most appropriate.  Using a sterile surgical marker, an appropriate rhombic flap was drawn incorporating the defect. The area thus outlined was incised deep to adipose tissue with a #15 scalpel blade.  The skin margins were undermined to an appropriate distance in all directions utilizing iris scissors.
Star Wedge Flap Text: The defect edges were debeveled with a #15 scalpel blade.  Given the location of the defect, shape of the defect and the proximity to free margins a star wedge flap was deemed most appropriate.  Using a sterile surgical marker, an appropriate rotation flap was drawn incorporating the defect and placing the expected incisions within the relaxed skin tension lines where possible. The area thus outlined was incised deep to adipose tissue with a #15 scalpel blade.  The skin margins were undermined to an appropriate distance in all directions utilizing iris scissors.
Estimated Blood Loss (Cc): minimal
Hemigard Intro: Due to skin fragility and wound tension, it was decided to use HEMIGARD adhesive retention suture devices to permit a linear closure. The skin was cleaned and dried for a 6cm distance away from the wound. Excessive hair, if present, was removed to allow for adhesion.
Slit Excision Additional Text (Leave Blank If You Do Not Want): A linear line was drawn on the skin overlying the lesion. An incision was made slowly until the lesion was visualized.  Once visualized, the lesion was removed with blunt dissection.
Interpolation Flap Text: A decision was made to reconstruct the defect utilizing an interpolation axial flap and a staged reconstruction.  A telfa template was made of the defect.  This telfa template was then used to outline the interpolation flap.  The donor area for the pedicle flap was then injected with anesthesia.  The flap was excised through the skin and subcutaneous tissue down to the layer of the underlying musculature.  The interpolation flap was carefully excised within this deep plane to maintain its blood supply.  The edges of the donor site were undermined.   The donor site was closed in a primary fashion.  The pedicle was then rotated into position and sutured.  Once the tube was sutured into place, adequate blood supply was confirmed with blanching and refill.  The pedicle was then wrapped with xeroform gauze and dressed appropriately with a telfa and gauze bandage to ensure continued blood supply and protect the attached pedicle.
Bilateral Helical Rim Advancement Flap Text: The defect edges were debeveled with a #15 blade scalpel.  Given the location of the defect and the proximity to free margins (helical rim) a bilateral helical rim advancement flap was deemed most appropriate.  Using a sterile surgical marker, the appropriate advancement flaps were drawn incorporating the defect and placing the expected incisions between the helical rim and antihelix where possible.  The area thus outlined was incised through and through with a #15 scalpel blade.  With a skin hook and iris scissors, the flaps were gently and sharply undermined and freed up.
Scalpel Size: 15 blade
Repair Type: Complex
Suturegard Body: The suture ends were repeatedly re-tightened and re-clamped to achieve the desired tissue expansion.
Epidermal Autograft Text: The defect edges were debeveled with a #15 scalpel blade.  Given the location of the defect, shape of the defect and the proximity to free margins an epidermal autograft was deemed most appropriate.  Using a sterile surgical marker, the primary defect shape was transferred to the donor site. The epidermal graft was then harvested.  The skin graft was then placed in the primary defect and oriented appropriately.
Complex Repair And Z Plasty Text: The defect edges were debeveled with a #15 scalpel blade.  The primary defect was closed partially with a complex linear closure.  Given the location of the remaining defect, shape of the defect and the proximity to free margins a Z plasty was deemed most appropriate for complete closure of the defect.  Using a sterile surgical marker, an appropriate advancement flap was drawn incorporating the defect and placing the expected incisions within the relaxed skin tension lines where possible.    The area thus outlined was incised deep to adipose tissue with a #15 scalpel blade.  The skin margins were undermined to an appropriate distance in all directions utilizing iris scissors.
Complex Repair And Single Advancement Flap Text: The defect edges were debeveled with a #15 scalpel blade.  The primary defect was closed partially with a complex linear closure.  Given the location of the remaining defect, shape of the defect and the proximity to free margins a single advancement flap was deemed most appropriate for complete closure of the defect.  Using a sterile surgical marker, an appropriate advancement flap was drawn incorporating the defect and placing the expected incisions within the relaxed skin tension lines where possible.    The area thus outlined was incised deep to adipose tissue with a #15 scalpel blade.  The skin margins were undermined to an appropriate distance in all directions utilizing iris scissors.
Complex Repair And Tissue Cultured Epidermal Autograft Text: The defect edges were debeveled with a #15 scalpel blade.  The primary defect was closed partially with a complex linear closure.  Given the location of the defect, shape of the defect and the proximity to free margins an tissue cultured epidermal autograft was deemed most appropriate to repair the remaining defect.  The graft was trimmed to fit the size of the remaining defect.  The graft was then placed in the primary defect, oriented appropriately, and sutured into place.
Undermining Type: Entire Wound
Complex Repair And Rotation Flap Text: The defect edges were debeveled with a #15 scalpel blade.  The primary defect was closed partially with a complex linear closure.  Given the location of the remaining defect, shape of the defect and the proximity to free margins a rotation flap was deemed most appropriate for complete closure of the defect.  Using a sterile surgical marker, an appropriate advancement flap was drawn incorporating the defect and placing the expected incisions within the relaxed skin tension lines where possible.    The area thus outlined was incised deep to adipose tissue with a #15 scalpel blade.  The skin margins were undermined to an appropriate distance in all directions utilizing iris scissors.
Billing Type: Third-Party Bill
Zygomaticofacial Flap Text: Given the location of the defect, shape of the defect and the proximity to free margins a zygomaticofacial flap was deemed most appropriate for repair.  Using a sterile surgical marker, the appropriate flap was drawn incorporating the defect and placing the expected incisions within the relaxed skin tension lines where possible. The area thus outlined was incised deep to adipose tissue with a #15 scalpel blade with preservation of a vascular pedicle.  The skin margins were undermined to an appropriate distance in all directions utilizing iris scissors.  The flap was then placed into the defect and anchored with interrupted buried subcutaneous sutures.
Post-Care Instructions: I reviewed with the patient in detail post-care instructions. Patient is not to engage in any heavy lifting, exercise which causes pulling on the excision site, or swimming for the next 14 days. The original dressing should be left in place for 2-3 days unless it becomes wet.  The dressing should then be changed daily with fresh petrolatum applied with a clean Q-tip and new bandage placed until the sutures are removed.  Should the patient develop any fevers, chills, bleeding, severe pain patient will contact the office immediately.
Double Island Pedicle Flap Text: The defect edges were debeveled with a #15 scalpel blade.  Given the location of the defect, shape of the defect and the proximity to free margins a double island pedicle advancement flap was deemed most appropriate.  Using a sterile surgical marker, an appropriate advancement flap was drawn incorporating the defect, outlining the appropriate donor tissue and placing the expected incisions within the relaxed skin tension lines where possible.    The area thus outlined was incised deep to adipose tissue with a #15 scalpel blade.  The skin margins were undermined to an appropriate distance in all directions around the primary defect and laterally outward around the island pedicle utilizing iris scissors.  There was minimal undermining beneath the pedicle flap.
Lip Wedge Excision Repair Text: Given the location of the defect and the proximity to free margins a full thickness wedge repair was deemed most appropriate.  Using a sterile surgical marker, the appropriate repair was drawn incorporating the defect and placing the expected incisions perpendicular to the vermilion border.  The vermilion border was also meticulously outlined to ensure appropriate reapproximation during the repair.  The area thus outlined was incised through and through with a #15 scalpel blade.  The muscularis and dermis were reaproximated with deep sutures following hemostasis. Care was taken to realign the vermilion border before proceeding with the superficial closure.  Once the vermilion was realigned the superfical and mucosal closure was finished.
Complex Repair And Dorsal Nasal Flap Text: The defect edges were debeveled with a #15 scalpel blade.  The primary defect was closed partially with a complex linear closure.  Given the location of the remaining defect, shape of the defect and the proximity to free margins a dorsal nasal flap was deemed most appropriate for complete closure of the defect.  Using a sterile surgical marker, an appropriate flap was drawn incorporating the defect and placing the expected incisions within the relaxed skin tension lines where possible.    The area thus outlined was incised deep to adipose tissue with a #15 scalpel blade.  The skin margins were undermined to an appropriate distance in all directions utilizing iris scissors.
Complex Repair And Ftsg Text: The defect edges were debeveled with a #15 scalpel blade.  The primary defect was closed partially with a complex linear closure.  Given the location of the defect, shape of the defect and the proximity to free margins a full thickness skin graft was deemed most appropriate to repair the remaining defect.  The graft was trimmed to fit the size of the remaining defect.  The graft was then placed in the primary defect, oriented appropriately, and sutured into place.
Cheek-To-Nose Interpolation Flap Text: A decision was made to reconstruct the defect utilizing an interpolation axial flap and a staged reconstruction.  A telfa template was made of the defect.  This telfa template was then used to outline the Cheek-To-Nose Interpolation flap.  The donor area for the pedicle flap was then injected with anesthesia.  The flap was excised through the skin and subcutaneous tissue down to the layer of the underlying musculature.  The interpolation flap was carefully excised within this deep plane to maintain its blood supply.  The edges of the donor site were undermined.   The donor site was closed in a primary fashion.  The pedicle was then rotated into position and sutured.  Once the tube was sutured into place, adequate blood supply was confirmed with blanching and refill.  The pedicle was then wrapped with xeroform gauze and dressed appropriately with a telfa and gauze bandage to ensure continued blood supply and protect the attached pedicle.
Hemigard Postcare Instructions: The HEMIGARD strips are to remain completely dry for at least 5-7 days.
Split-Thickness Skin Graft Text: The defect edges were debeveled with a #15 scalpel blade.  Given the location of the defect, shape of the defect and the proximity to free margins a split thickness skin graft was deemed most appropriate.  Using a sterile surgical marker, the primary defect shape was transferred to the donor site. The split thickness graft was then harvested.  The skin graft was then placed in the primary defect and oriented appropriately.
Nasal Turnover Hinge Flap Text: The defect edges were debeveled with a #15 scalpel blade.  Given the size, depth, location of the defect and the defect being full thickness a nasal turnover hinge flap was deemed most appropriate.  Using a sterile surgical marker, an appropriate hinge flap was drawn incorporating the defect. The area thus outlined was incised with a #15 scalpel blade. The flap was designed to recreate the nasal mucosal lining and the alar rim. The skin margins were undermined to an appropriate distance in all directions utilizing iris scissors.
Xenograft Text: The defect edges were debeveled with a #15 scalpel blade.  Given the location of the defect, shape of the defect and the proximity to free margins a xenograft was deemed most appropriate.  The graft was then trimmed to fit the size of the defect.  The graft was then placed in the primary defect and oriented appropriately.
Intermediate / Complex Repair - Final Wound Length In Cm: 2
Lab Facility: 
Complex Repair And Dermal Autograft Text: The defect edges were debeveled with a #15 scalpel blade.  The primary defect was closed partially with a complex linear closure.  Given the location of the defect, shape of the defect and the proximity to free margins an dermal autograft was deemed most appropriate to repair the remaining defect.  The graft was trimmed to fit the size of the remaining defect.  The graft was then placed in the primary defect, oriented appropriately, and sutured into place.
Transposition Flap Text: The defect edges were debeveled with a #15 scalpel blade.  Given the location of the defect and the proximity to free margins a transposition flap was deemed most appropriate.  Using a sterile surgical marker, an appropriate transposition flap was drawn incorporating the defect.    The area thus outlined was incised deep to adipose tissue with a #15 scalpel blade.  The skin margins were undermined to an appropriate distance in all directions utilizing iris scissors.
Melolabial Interpolation Flap Text: A decision was made to reconstruct the defect utilizing an interpolation axial flap and a staged reconstruction.  A telfa template was made of the defect.  This telfa template was then used to outline the melolabial interpolation flap.  The donor area for the pedicle flap was then injected with anesthesia.  The flap was excised through the skin and subcutaneous tissue down to the layer of the underlying musculature.  The pedicle flap was carefully excised within this deep plane to maintain its blood supply.  The edges of the donor site were undermined.   The donor site was closed in a primary fashion.  The pedicle was then rotated into position and sutured.  Once the tube was sutured into place, adequate blood supply was confirmed with blanching and refill.  The pedicle was then wrapped with xeroform gauze and dressed appropriately with a telfa and gauze bandage to ensure continued blood supply and protect the attached pedicle.
Dermal Closure: buried vertical mattress
O-L Flap Text: The defect edges were debeveled with a #15 scalpel blade.  Given the location of the defect, shape of the defect and the proximity to free margins an O-L flap was deemed most appropriate.  Using a sterile surgical marker, an appropriate advancement flap was drawn incorporating the defect and placing the expected incisions within the relaxed skin tension lines where possible.    The area thus outlined was incised deep to adipose tissue with a #15 scalpel blade.  The skin margins were undermined to an appropriate distance in all directions utilizing iris scissors.
Z Plasty Text: The lesion was extirpated to the level of the fat with a #15 scalpel blade.  Given the location of the defect, shape of the defect and the proximity to free margins a Z-plasty was deemed most appropriate for repair.  Using a sterile surgical marker, the appropriate transposition arms of the Z-plasty were drawn incorporating the defect and placing the expected incisions within the relaxed skin tension lines where possible.    The area thus outlined was incised deep to adipose tissue with a #15 scalpel blade.  The skin margins were undermined to an appropriate distance in all directions utilizing iris scissors.  The opposing transposition arms were then transposed into place in opposite direction and anchored with interrupted buried subcutaneous sutures.
Posterior Auricular Interpolation Flap Text: A decision was made to reconstruct the defect utilizing an interpolation axial flap and a staged reconstruction.  A telfa template was made of the defect.  This telfa template was then used to outline the posterior auricular interpolation flap.  The donor area for the pedicle flap was then injected with anesthesia.  The flap was excised through the skin and subcutaneous tissue down to the layer of the underlying musculature.  The pedicle flap was carefully excised within this deep plane to maintain its blood supply.  The edges of the donor site were undermined.   The donor site was closed in a primary fashion.  The pedicle was then rotated into position and sutured.  Once the tube was sutured into place, adequate blood supply was confirmed with blanching and refill.  The pedicle was then wrapped with xeroform gauze and dressed appropriately with a telfa and gauze bandage to ensure continued blood supply and protect the attached pedicle.
Helical Rim Text: The closure involved the helical rim.
Mucosal Advancement Flap Text: Given the location of the defect, shape of the defect and the proximity to free margins a mucosal advancement flap was deemed most appropriate. Incisions were made with a 15 blade scalpel in the appropriate fashion along the cutaneous vermillion border and the mucosal lip. The remaining actinically damaged mucosal tissue was excised.  The mucosal advancement flap was then elevated to the gingival sulcus with care taken to preserve the neurovascular structures and advanced into the primary defect. Care was taken to ensure that precise realignment of the vermilion border was achieved.
Double O-Z Flap Text: The defect edges were debeveled with a #15 scalpel blade.  Given the location of the defect, shape of the defect and the proximity to free margins a Double O-Z flap was deemed most appropriate.  Using a sterile surgical marker, an appropriate transposition flap was drawn incorporating the defect and placing the expected incisions within the relaxed skin tension lines where possible. The area thus outlined was incised deep to adipose tissue with a #15 scalpel blade.  The skin margins were undermined to an appropriate distance in all directions utilizing iris scissors.
Mercedes Flap Text: The defect edges were debeveled with a #15 scalpel blade.  Given the location of the defect, shape of the defect and the proximity to free margins a Mercedes flap was deemed most appropriate.  Using a sterile surgical marker, an appropriate advancement flap was drawn incorporating the defect and placing the expected incisions within the relaxed skin tension lines where possible. The area thus outlined was incised deep to adipose tissue with a #15 scalpel blade.  The skin margins were undermined to an appropriate distance in all directions utilizing iris scissors.
Eliptical Excision Additional Text (Leave Blank If You Do Not Want): The margin was drawn around the clinically apparent lesion.  An elliptical shape was then drawn on the skin incorporating the lesion and margins.  Incisions were then made along these lines to the appropriate tissue plane and the lesion was extirpated.
Cheek Interpolation Flap Text: A decision was made to reconstruct the defect utilizing an interpolation axial flap and a staged reconstruction.  A telfa template was made of the defect.  This telfa template was then used to outline the Cheek Interpolation flap.  The donor area for the pedicle flap was then injected with anesthesia.  The flap was excised through the skin and subcutaneous tissue down to the layer of the underlying musculature.  The interpolation flap was carefully excised within this deep plane to maintain its blood supply.  The edges of the donor site were undermined.   The donor site was closed in a primary fashion.  The pedicle was then rotated into position and sutured.  Once the tube was sutured into place, adequate blood supply was confirmed with blanching and refill.  The pedicle was then wrapped with xeroform gauze and dressed appropriately with a telfa and gauze bandage to ensure continued blood supply and protect the attached pedicle.
S Plasty Text: Given the location and shape of the defect, and the orientation of relaxed skin tension lines, an S-plasty was deemed most appropriate for repair.  Using a sterile surgical marker, the appropriate outline of the S-plasty was drawn, incorporating the defect and placing the expected incisions within the relaxed skin tension lines where possible.  The area thus outlined was incised deep to adipose tissue with a #15 scalpel blade.  The skin margins were undermined to an appropriate distance in all directions utilizing iris scissors. The skin flaps were advanced over the defect.  The opposing margins were then approximated with interrupted buried subcutaneous sutures.
Skin Substitute Text: The defect edges were debeveled with a #15 scalpel blade.  Given the location of the defect, shape of the defect and the proximity to free margins a skin substitute graft was deemed most appropriate.  The graft material was trimmed to fit the size of the defect. The graft was then placed in the primary defect and oriented appropriately.
Advancement Flap (Double) Text: The defect edges were debeveled with a #15 scalpel blade.  Given the location of the defect and the proximity to free margins a double advancement flap was deemed most appropriate.  Using a sterile surgical marker, the appropriate advancement flaps were drawn incorporating the defect and placing the expected incisions within the relaxed skin tension lines where possible.    The area thus outlined was incised deep to adipose tissue with a #15 scalpel blade.  The skin margins were undermined to an appropriate distance in all directions utilizing iris scissors.
Composite Graft Text: The defect edges were debeveled with a #15 scalpel blade.  Given the location of the defect, shape of the defect, the proximity to free margins and the fact the defect was full thickness a composite graft was deemed most appropriate.  The defect was outline and then transferred to the donor site.  A full thickness graft was then excised from the donor site. The graft was then placed in the primary defect, oriented appropriately and then sutured into place.  The secondary defect was then repaired using a primary closure.
Home Suture Removal Text: Patient will remove their sutures at home.  If they have any questions or difficulties they will call the office.
Keystone Flap Text: The defect edges were debeveled with a #15 scalpel blade.  Given the location of the defect, shape of the defect a keystone flap was deemed most appropriate.  Using a sterile surgical marker, an appropriate keystone flap was drawn incorporating the defect, outlining the appropriate donor tissue and placing the expected incisions within the relaxed skin tension lines where possible. The area thus outlined was incised deep to adipose tissue with a #15 scalpel blade.  The skin margins were undermined to an appropriate distance in all directions around the primary defect and laterally outward around the flap utilizing iris scissors.
Burow's Graft Text: The defect edges were debeveled with a #15 scalpel blade.  Given the location of the defect, shape of the defect, the proximity to free margins and the presence of a standing cone deformity a Burow's skin graft was deemed most appropriate. The standing cone was removed and this tissue was then trimmed to the shape of the primary defect. The adipose tissue was also removed until only dermis and epidermis were left.  The skin margins of the secondary defect were undermined to an appropriate distance in all directions utilizing iris scissors.  The secondary defect was closed with interrupted buried subcutaneous sutures.  The skin edges were then re-apposed with running  sutures.  The skin graft was then placed in the primary defect and oriented appropriately.
Anesthesia Type: 1% lidocaine with epinephrine
Complex Repair And M Plasty Text: The defect edges were debeveled with a #15 scalpel blade.  The primary defect was closed partially with a complex linear closure.  Given the location of the remaining defect, shape of the defect and the proximity to free margins an M plasty was deemed most appropriate for complete closure of the defect.  Using a sterile surgical marker, an appropriate advancement flap was drawn incorporating the defect and placing the expected incisions within the relaxed skin tension lines where possible.    The area thus outlined was incised deep to adipose tissue with a #15 scalpel blade.  The skin margins were undermined to an appropriate distance in all directions utilizing iris scissors.
Island Pedicle Flap-Requiring Vessel Identification Text: The defect edges were debeveled with a #15 scalpel blade.  Given the location of the defect, shape of the defect and the proximity to free margins an island pedicle advancement flap was deemed most appropriate.  Using a sterile surgical marker, an appropriate advancement flap was drawn, based on the axial vessel mentioned above, incorporating the defect, outlining the appropriate donor tissue and placing the expected incisions within the relaxed skin tension lines where possible.    The area thus outlined was incised deep to adipose tissue with a #15 scalpel blade.  The skin margins were undermined to an appropriate distance in all directions around the primary defect and laterally outward around the island pedicle utilizing iris scissors.  There was minimal undermining beneath the pedicle flap.
Spiral Flap Text: The defect edges were debeveled with a #15 scalpel blade.  Given the location of the defect, shape of the defect and the proximity to free margins a spiral flap was deemed most appropriate.  Using a sterile surgical marker, an appropriate rotation flap was drawn incorporating the defect and placing the expected incisions within the relaxed skin tension lines where possible. The area thus outlined was incised deep to adipose tissue with a #15 scalpel blade.  The skin margins were undermined to an appropriate distance in all directions utilizing iris scissors.
Purse String (Intermediate) Text: Given the location of the defect and the characteristics of the surrounding skin a purse string intermediate closure was deemed most appropriate.  Undermining was performed circumferentially around the surgical defect.  A purse string suture was then placed and tightened.
Advancement Flap (Single) Text: The defect edges were debeveled with a #15 scalpel blade.  Given the location of the defect and the proximity to free margins a single advancement flap was deemed most appropriate.  Using a sterile surgical marker, an appropriate advancement flap was drawn incorporating the defect and placing the expected incisions within the relaxed skin tension lines where possible.    The area thus outlined was incised deep to adipose tissue with a #15 scalpel blade.  The skin margins were undermined to an appropriate distance in all directions utilizing iris scissors.
O-T Plasty Text: The defect edges were debeveled with a #15 scalpel blade.  Given the location of the defect, shape of the defect and the proximity to free margins an O-T plasty was deemed most appropriate.  Using a sterile surgical marker, an appropriate O-T plasty was drawn incorporating the defect and placing the expected incisions within the relaxed skin tension lines where possible.    The area thus outlined was incised deep to adipose tissue with a #15 scalpel blade.  The skin margins were undermined to an appropriate distance in all directions utilizing iris scissors.
Dermal Autograft Text: The defect edges were debeveled with a #15 scalpel blade.  Given the location of the defect, shape of the defect and the proximity to free margins a dermal autograft was deemed most appropriate.  Using a sterile surgical marker, the primary defect shape was transferred to the donor site. The area thus outlined was incised deep to adipose tissue with a #15 scalpel blade.  The harvested graft was then trimmed of adipose and epidermal tissue until only dermis was left.  The skin graft was then placed in the primary defect and oriented appropriately.
Excision Depth: adipose tissue
O-Z Plasty Text: The defect edges were debeveled with a #15 scalpel blade.  Given the location of the defect, shape of the defect and the proximity to free margins an O-Z plasty (double transposition flap) was deemed most appropriate.  Using a sterile surgical marker, the appropriate transposition flaps were drawn incorporating the defect and placing the expected incisions within the relaxed skin tension lines where possible.    The area thus outlined was incised deep to adipose tissue with a #15 scalpel blade.  The skin margins were undermined to an appropriate distance in all directions utilizing iris scissors.  Hemostasis was achieved with electrocautery.  The flaps were then transposed into place, one clockwise and the other counterclockwise, and anchored with interrupted buried subcutaneous sutures.
Complex Repair And V-Y Plasty Text: The defect edges were debeveled with a #15 scalpel blade.  The primary defect was closed partially with a complex linear closure.  Given the location of the remaining defect, shape of the defect and the proximity to free margins a V-Y plasty was deemed most appropriate for complete closure of the defect.  Using a sterile surgical marker, an appropriate advancement flap was drawn incorporating the defect and placing the expected incisions within the relaxed skin tension lines where possible.    The area thus outlined was incised deep to adipose tissue with a #15 scalpel blade.  The skin margins were undermined to an appropriate distance in all directions utilizing iris scissors.
Positioning (Leave Blank If You Do Not Want): The patient was placed in a comfortable position exposing the surgical site.
Advancement-Rotation Flap Text: The defect edges were debeveled with a #15 scalpel blade.  Given the location of the defect, shape of the defect and the proximity to free margins an advancement-rotation flap was deemed most appropriate.  Using a sterile surgical marker, an appropriate flap was drawn incorporating the defect and placing the expected incisions within the relaxed skin tension lines where possible. The area thus outlined was incised deep to adipose tissue with a #15 scalpel blade.  The skin margins were undermined to an appropriate distance in all directions utilizing iris scissors.
Peng Advancement Flap Text: The defect edges were debeveled with a #15 scalpel blade.  Given the location of the defect, shape of the defect and the proximity to free margins a Peng advancement flap was deemed most appropriate.  Using a sterile surgical marker, an appropriate advancement flap was drawn incorporating the defect and placing the expected incisions within the relaxed skin tension lines where possible. The area thus outlined was incised deep to adipose tissue with a #15 scalpel blade.  The skin margins were undermined to an appropriate distance in all directions utilizing iris scissors.
Staged Advancement Flap Text: The defect edges were debeveled with a #15 scalpel blade.  Given the location of the defect, shape of the defect and the proximity to free margins a staged advancement flap was deemed most appropriate.  Using a sterile surgical marker, an appropriate advancement flap was drawn incorporating the defect and placing the expected incisions within the relaxed skin tension lines where possible. The area thus outlined was incised deep to adipose tissue with a #15 scalpel blade.  The skin margins were undermined to an appropriate distance in all directions utilizing iris scissors.
Eye Clamp Note Details: An eye clamp was used during the procedure.

## 2021-08-03 NOTE — PROCEDURE: MIPS QUALITY
Detail Level: Detailed
Quality 111:Pneumonia Vaccination Status For Older Adults: Pneumococcal Vaccination not Administered or Previously Received, Reason not Otherwise Specified
Quality 431: Preventive Care And Screening: Unhealthy Alcohol Use - Screening: Patient screened for unhealthy alcohol use using a single question and scores less than 2 times per year
Quality 226: Preventive Care And Screening: Tobacco Use: Screening And Cessation Intervention: Patient screened for tobacco use and is an ex/non-smoker
Quality 110: Preventive Care And Screening: Influenza Immunization: Influenza immunization was not ordered or administered, reason not given
Quality 130: Documentation Of Current Medications In The Medical Record: Current Medications Documented

## 2021-08-17 ENCOUNTER — APPOINTMENT (RX ONLY)
Dept: URBAN - METROPOLITAN AREA CLINIC 31 | Facility: CLINIC | Age: 76
Setting detail: DERMATOLOGY
End: 2021-08-17

## 2021-08-17 DIAGNOSIS — Z48.02 ENCOUNTER FOR REMOVAL OF SUTURES: ICD-10-CM

## 2021-08-17 PROCEDURE — ? COUNSELING

## 2021-08-17 PROCEDURE — ? SUTURE REMOVAL (GLOBAL PERIOD)

## 2021-08-17 ASSESSMENT — LOCATION SIMPLE DESCRIPTION DERM: LOCATION SIMPLE: RIGHT UPPER BACK

## 2021-08-17 ASSESSMENT — LOCATION ZONE DERM: LOCATION ZONE: TRUNK

## 2021-08-17 ASSESSMENT — LOCATION DETAILED DESCRIPTION DERM: LOCATION DETAILED: RIGHT SUPERIOR MEDIAL UPPER BACK

## 2021-08-17 NOTE — PROCEDURE: SUTURE REMOVAL (GLOBAL PERIOD)
Detail Level: Detailed
Add 47871 Cpt? (Important Note: In 2017 The Use Of 02099 Is Being Tracked By Cms To Determine Future Global Period Reimbursement For Global Periods): yes

## 2021-10-21 ENCOUNTER — APPOINTMENT (RX ONLY)
Dept: URBAN - METROPOLITAN AREA CLINIC 31 | Facility: CLINIC | Age: 76
Setting detail: DERMATOLOGY
End: 2021-10-21

## 2021-10-21 DIAGNOSIS — Z71.89 OTHER SPECIFIED COUNSELING: ICD-10-CM

## 2021-10-21 DIAGNOSIS — L81.4 OTHER MELANIN HYPERPIGMENTATION: ICD-10-CM

## 2021-10-21 DIAGNOSIS — L82.1 OTHER SEBORRHEIC KERATOSIS: ICD-10-CM

## 2021-10-21 DIAGNOSIS — D18.0 HEMANGIOMA: ICD-10-CM

## 2021-10-21 DIAGNOSIS — L72.0 EPIDERMAL CYST: ICD-10-CM

## 2021-10-21 DIAGNOSIS — D22 MELANOCYTIC NEVI: ICD-10-CM

## 2021-10-21 PROBLEM — D22.61 MELANOCYTIC NEVI OF RIGHT UPPER LIMB, INCLUDING SHOULDER: Status: ACTIVE | Noted: 2021-10-21

## 2021-10-21 PROBLEM — D18.01 HEMANGIOMA OF SKIN AND SUBCUTANEOUS TISSUE: Status: ACTIVE | Noted: 2021-10-21

## 2021-10-21 PROBLEM — D22.5 MELANOCYTIC NEVI OF TRUNK: Status: ACTIVE | Noted: 2021-10-21

## 2021-10-21 PROBLEM — D22.72 MELANOCYTIC NEVI OF LEFT LOWER LIMB, INCLUDING HIP: Status: ACTIVE | Noted: 2021-10-21

## 2021-10-21 PROBLEM — D22.62 MELANOCYTIC NEVI OF LEFT UPPER LIMB, INCLUDING SHOULDER: Status: ACTIVE | Noted: 2021-10-21

## 2021-10-21 PROBLEM — D22.71 MELANOCYTIC NEVI OF RIGHT LOWER LIMB, INCLUDING HIP: Status: ACTIVE | Noted: 2021-10-21

## 2021-10-21 PROCEDURE — ? COUNSELING

## 2021-10-21 PROCEDURE — 99213 OFFICE O/P EST LOW 20 MIN: CPT

## 2021-10-21 PROCEDURE — ? EXTRACTIONS

## 2021-10-21 ASSESSMENT — LOCATION SIMPLE DESCRIPTION DERM
LOCATION SIMPLE: RIGHT HAND
LOCATION SIMPLE: RIGHT LOWER BACK
LOCATION SIMPLE: LEFT CHEEK
LOCATION SIMPLE: RIGHT UPPER ARM
LOCATION SIMPLE: ABDOMEN
LOCATION SIMPLE: RIGHT FOREARM
LOCATION SIMPLE: LEFT CALF
LOCATION SIMPLE: RIGHT CLAVICULAR SKIN
LOCATION SIMPLE: RIGHT CHEEK
LOCATION SIMPLE: RIGHT THIGH
LOCATION SIMPLE: LEFT THIGH
LOCATION SIMPLE: RIGHT UPPER BACK
LOCATION SIMPLE: LEFT SHOULDER
LOCATION SIMPLE: LEFT UPPER ARM
LOCATION SIMPLE: RIGHT CALF
LOCATION SIMPLE: LEFT FOREARM
LOCATION SIMPLE: RIGHT SHOULDER
LOCATION SIMPLE: UPPER BACK
LOCATION SIMPLE: LEFT HAND

## 2021-10-21 ASSESSMENT — LOCATION DETAILED DESCRIPTION DERM
LOCATION DETAILED: RIGHT ANTERIOR DISTAL THIGH
LOCATION DETAILED: RIGHT CENTRAL MALAR CHEEK
LOCATION DETAILED: RIGHT ULNAR DORSAL HAND
LOCATION DETAILED: RIGHT PROXIMAL DORSAL FOREARM
LOCATION DETAILED: RIGHT PROXIMAL POSTERIOR UPPER ARM
LOCATION DETAILED: LEFT RADIAL DORSAL HAND
LOCATION DETAILED: LEFT POSTERIOR SHOULDER
LOCATION DETAILED: LEFT ANTERIOR DISTAL UPPER ARM
LOCATION DETAILED: LEFT VENTRAL DISTAL FOREARM
LOCATION DETAILED: RIGHT CLAVICULAR SKIN
LOCATION DETAILED: RIGHT VENTRAL DISTAL FOREARM
LOCATION DETAILED: EPIGASTRIC SKIN
LOCATION DETAILED: RIGHT POSTERIOR SHOULDER
LOCATION DETAILED: PERIUMBILICAL SKIN
LOCATION DETAILED: LEFT LATERAL MALAR CHEEK
LOCATION DETAILED: LEFT PROXIMAL POSTERIOR UPPER ARM
LOCATION DETAILED: RIGHT ANTERIOR DISTAL UPPER ARM
LOCATION DETAILED: RIGHT PROXIMAL CALF
LOCATION DETAILED: RIGHT INFERIOR MEDIAL UPPER BACK
LOCATION DETAILED: LEFT PROXIMAL CALF
LOCATION DETAILED: RIGHT SUPERIOR MEDIAL MIDBACK
LOCATION DETAILED: INFERIOR THORACIC SPINE
LOCATION DETAILED: LEFT PROXIMAL DORSAL FOREARM
LOCATION DETAILED: LEFT ANTERIOR DISTAL THIGH

## 2021-10-21 ASSESSMENT — LOCATION ZONE DERM
LOCATION ZONE: FACE
LOCATION ZONE: TRUNK
LOCATION ZONE: ARM
LOCATION ZONE: LEG
LOCATION ZONE: HAND

## 2021-10-21 NOTE — PROCEDURE: EXTRACTIONS
Detail Level: Detailed
Render Number Of Lesions Treated: no
Prep Text (Optional): Prior to removal the treatment areas were prepped in the usual fashion.
Acne Type: A cyst
Post-Care Instructions: I reviewed with the patient in detail post-care instructions. Patient is to keep the treatment areas dry overnight, and then apply bacitracin twice daily until healed. Patient may apply hydrogen peroxide soaks to remove any crusting.
Consent was obtained and risks were reviewed including but not limited to scarring, infection, bleeding, scabbing, incomplete removal, and allergy to anesthesia.
Extraction Method: 11 blade
normal...

## 2022-07-05 ENCOUNTER — APPOINTMENT (RX ONLY)
Dept: URBAN - METROPOLITAN AREA CLINIC 31 | Facility: CLINIC | Age: 77
Setting detail: DERMATOLOGY
End: 2022-07-05

## 2022-07-05 DIAGNOSIS — L57.0 ACTINIC KERATOSIS: ICD-10-CM

## 2022-07-05 DIAGNOSIS — L72.0 EPIDERMAL CYST: ICD-10-CM

## 2022-07-05 DIAGNOSIS — L82.0 INFLAMED SEBORRHEIC KERATOSIS: ICD-10-CM

## 2022-07-05 DIAGNOSIS — L82.1 OTHER SEBORRHEIC KERATOSIS: ICD-10-CM

## 2022-07-05 PROCEDURE — ? COUNSELING

## 2022-07-05 PROCEDURE — ? LIQUID NITROGEN

## 2022-07-05 PROCEDURE — 17000 DESTRUCT PREMALG LESION: CPT

## 2022-07-05 PROCEDURE — 17003 DESTRUCT PREMALG LES 2-14: CPT

## 2022-07-05 PROCEDURE — 99213 OFFICE O/P EST LOW 20 MIN: CPT | Mod: 25

## 2022-07-05 ASSESSMENT — LOCATION SIMPLE DESCRIPTION DERM
LOCATION SIMPLE: LEFT SUPERIOR EYELID
LOCATION SIMPLE: CHEST
LOCATION SIMPLE: LEFT PRETIBIAL REGION
LOCATION SIMPLE: ABDOMEN
LOCATION SIMPLE: RIGHT PRETIBIAL REGION
LOCATION SIMPLE: SCALP
LOCATION SIMPLE: RIGHT OCCIPITAL SCALP

## 2022-07-05 ASSESSMENT — LOCATION DETAILED DESCRIPTION DERM
LOCATION DETAILED: LEFT LATERAL SUPERIOR EYELID
LOCATION DETAILED: RIGHT SUPERIOR PARIETAL SCALP
LOCATION DETAILED: RIGHT LATERAL SUPERIOR CHEST
LOCATION DETAILED: LEFT LATERAL ABDOMEN
LOCATION DETAILED: RIGHT PROXIMAL PRETIBIAL REGION
LOCATION DETAILED: LEFT PROXIMAL PRETIBIAL REGION
LOCATION DETAILED: LEFT SUPERIOR PARIETAL SCALP
LOCATION DETAILED: RIGHT SUPERIOR OCCIPITAL SCALP
LOCATION DETAILED: LEFT LATERAL SUPERIOR CHEST
LOCATION DETAILED: PERIUMBILICAL SKIN

## 2022-07-05 ASSESSMENT — LOCATION ZONE DERM
LOCATION ZONE: TRUNK
LOCATION ZONE: EYELID
LOCATION ZONE: SCALP
LOCATION ZONE: LEG

## 2022-12-02 ENCOUNTER — OFFICE VISIT (OUTPATIENT)
Dept: SLEEP MEDICINE | Facility: MEDICAL CENTER | Age: 77
End: 2022-12-02
Payer: MEDICARE

## 2022-12-02 VITALS
DIASTOLIC BLOOD PRESSURE: 60 MMHG | HEIGHT: 69 IN | HEART RATE: 67 BPM | RESPIRATION RATE: 14 BRPM | BODY MASS INDEX: 24.59 KG/M2 | SYSTOLIC BLOOD PRESSURE: 120 MMHG | WEIGHT: 166 LBS | OXYGEN SATURATION: 95 %

## 2022-12-02 DIAGNOSIS — G47.30 BREATHING-RELATED SLEEP DISORDER: ICD-10-CM

## 2022-12-02 DIAGNOSIS — G47.20 SLEEP STAGE DYSFUNCTION: ICD-10-CM

## 2022-12-02 DIAGNOSIS — R41.3 MEMORY CHANGE: ICD-10-CM

## 2022-12-02 DIAGNOSIS — R53.82 CHRONIC FATIGUE, UNSPECIFIED: ICD-10-CM

## 2022-12-02 DIAGNOSIS — R06.83 SNORING: ICD-10-CM

## 2022-12-02 DIAGNOSIS — R06.81 WITNESSED EPISODE OF APNEA: ICD-10-CM

## 2022-12-02 PROCEDURE — 99204 OFFICE O/P NEW MOD 45 MIN: CPT | Performed by: PREVENTIVE MEDICINE

## 2022-12-02 RX ORDER — OMEPRAZOLE 20 MG/1
20 CAPSULE, DELAYED RELEASE ORAL DAILY
COMMUNITY

## 2022-12-02 RX ORDER — ZOLPIDEM TARTRATE 5 MG/1
5 TABLET ORAL NIGHTLY PRN
Qty: 1 TABLET | Refills: 0 | Status: SHIPPED | OUTPATIENT
Start: 2022-12-02 | End: 2023-05-02

## 2022-12-02 NOTE — PROGRESS NOTES
CHIEF COMPLIANT:   Establishing care  HISTORY OF PRESENT ILLNESS:  Meme Shelton is a 77 y.o.female who is here to establish care.   Collateral:  [place]     Sleep History   Sleep study done in [place], [place] in August of this year. She does not have the results but has agreed to sign an LOU. She understands if we can not get access to the study that she will have to repeat the study. She reports loud snoring, daytime fatigue, and acting out dreams. She goes to bed at 10pm and falls asleep in 30mins and wakes up at 8am. She wakes up 4x at night and is never refreshed in the morning. She does nap daily for 1.5 hrs. She is a former smoker, having a 7 pack year history and quit in 1981. She has never used cannabis products and drinks 2 alcoholic beverages a week and two cups of coffee per day. She also is sure she stops breathing in her sleep and has vivid dreams. She reports leg twitching at night and problems concentrating. She uses advil PM as a sleep aid.     Sleep study results:pending  LOU submission      Significant comorbidities and modifying factors: see below     PAST MEDICAL HISTORY:  History reviewed. No pertinent past medical history.   PROBLEM LIST:    PAST SOCIAL HISTORY:  History reviewed. No pertinent surgical history.  PAST FAMILY HISTORY:  History reviewed. No pertinent family history.  SOCIAL HISTORY:  Social History     Socioeconomic History    Marital status:      Spouse name: Not on file    Number of children: Not on file    Years of education: Not on file    Highest education level: Not on file   Occupational History    Not on file   Tobacco Use    Smoking status: Former     Types: Cigarettes    Smokeless tobacco: Never   Vaping Use    Vaping Use: Never used   Substance and Sexual Activity    Alcohol use: Yes     Comment: 2 glasses a week    Drug use: Never    Sexual activity: Not on file   Other Topics Concern    Not on file   Social History Narrative    Not on file     Social  "Determinants of Health     Financial Resource Strain: Not on file   Food Insecurity: Not on file   Transportation Needs: Not on file   Physical Activity: Not on file   Stress: Not on file   Social Connections: Not on file   Intimate Partner Violence: Not on file   Housing Stability: Not on file     ALLERGIES: Patient has no known allergies.  MEDICATIONS:  Current Outpatient Medications   Medication Sig Dispense Refill    zolpidem (AMBIEN) 5 MG Tab Take 1 Tablet by mouth at bedtime as needed for Sleep for up to 1 dose. On day supply 1 Tablet 0    dilTIAZem HCl ER Beads (TAZTIA XT PO) Take  by mouth.      Cholecalciferol (D3 5000 PO) Take  by mouth.      SITagliptin Phosphate (JANUVIA PO) Take  by mouth.      omeprazole (PRILOSEC) 20 MG delayed-release capsule Take 20 mg by mouth every day.      FLUOXETINE HCL PO Take  by mouth.       No current facility-administered medications for this visit.    \"CURRENT RX\"    REVIEW OF SYSTEMS:  Constitutional: Denies weight loss, endorses chronic daytime fatigue  Eyes: Denies vision changes  Ears/Nose/Mouth/Throat: Denies rhinitis/nasal congestion, injury, decayed teeth/toothaches.  Cardiovascular: Denies chest pain, tightness, palpitations, swelling in legs/feet, difficulty breathing when lying down but gets better when sitting up.   Respiratory: Denies shortness of breath while awake,  Sleep: per HPI  Gastrointestinal: Denies  difficulty swallowing,  heartburn.  Genitourinary: REPORTS nocturia  Musculoskeletal: Denies painful joints, sore muscles, back pain.   Neurological: Denies frequent headaches,weakness, dizziness.    PHYSICAL EXAM/VITALS:  /60 (BP Location: Left arm, Patient Position: Sitting, BP Cuff Size: Large adult)   Pulse 67   Resp 14   Ht 1.753 m (5' 9\")   Wt 75.3 kg (166 lb)   SpO2 95%   BMI 24.51 kg/m²   Appearance: Well-nourished, well-developed,  looks stated age, no acute distress  Eyes:   EOMI  ENMT: MASKED  Neck: Supple, trachea midline, 16 inch " neck circumference.   Respiratory effort:  No intercostal retractions or use of accessory muscles  Musculoskeletal:  Grossly normal; gait and station normal  Neurologic:  oriented to person, time, place, and purpose; judgement intact  Psychiatric:  No depression, anxiety, agitation      MEDICAL DECISION MAKING:  The medical record was reviewed as it pertains to this referral. This includes records from primary care,consultants notes, referral request, hospital records, labs and imaging. Any available diagnostic and titration nocturnal polysomnograms, home sleep apnea tests, continuous nocturnal oximetry results, multiple sleep latency tests, and recent compliance reports were reviewed with the patient.    ASSESSMENT/PLAN:  Meme Shelton is a 77 y.o.female who has a high pretest probability of having obstructive sleep apnea. She was told she did had sleep apnea on her previous test, she is requesting to retest immediately rather to wait for other data to come in. Because of her possible dream reenactment she will be scheduled for an in house PSG.       1. Breathing-related sleep disorder  - Polysomnography, 4 or More; Future  - zolpidem (AMBIEN) 5 MG Tab; Take 1 Tablet by mouth at bedtime as needed for Sleep for up to 1 dose. On day supply  Dispense: 1 Tablet; Refill: 0    2. Snoring  - Polysomnography, 4 or More; Future  - zolpidem (AMBIEN) 5 MG Tab; Take 1 Tablet by mouth at bedtime as needed for Sleep for up to 1 dose. On day supply  Dispense: 1 Tablet; Refill: 0    3. Witnessed episode of apnea  - Polysomnography, 4 or More; Future  - zolpidem (AMBIEN) 5 MG Tab; Take 1 Tablet by mouth at bedtime as needed for Sleep for up to 1 dose. On day supply  Dispense: 1 Tablet; Refill: 0    4. Chronic fatigue, unspecified  - Polysomnography, 4 or More; Future    5. Memory change  - Polysomnography, 4 or More; Future    6. Sleep stage dysfunction  - Polysomnography, 4 or More; Future    Other orders  - dilTIAZem HCl ER Beads  (TAZTIA XT PO); Take  by mouth.  - Cholecalciferol (D3 5000 PO); Take  by mouth.  - SITagliptin Phosphate (JANUVIA PO); Take  by mouth.  - omeprazole (PRILOSEC) 20 MG delayed-release capsule; Take 20 mg by mouth every day.  - FLUOXETINE HCL PO; Take  by mouth.        The risks of untreated sleep apnea were discussed with the patient at length. Patients with RAD are at increased risk of cardiovascular disease including coronary artery disease, systemic arterial hypertension, pulmonary arterial hypertension, cardiac arrhythmias, and stroke. RAD patients have an increased risk of motor vehicle accidents, type 2 diabetes, chronic kidney disease, and non-alcoholic liver disease. The patient was advised to avoid driving a motor vehicle when drowsy.  Have advised the patient to follow up with the appropriate healthcare practitioners for all other medical problems and issues.    RETURN TO CLINIC: Return for 3 weeks after ss to discuss results -DR. Carlos.    My total time spent caring for the patient on the day of the encounter was 40 minutes. This includes time spent on a thorough chart review including other physician notes, all sleep studies, as well as critical labs and pulmonary and cardiac studies.  Additionally, it includes a thorough discussion of good sleep hygiene and stimulus control, as well as  the need for consistency in terms of sleep preparation and practice.    Please note that this dictation was created using voice recognition software.  I have made every reasonable attempt to correct obvious errors, I expect that there are errors of grammar and possibly content that I did not discover before finalizing this note.

## 2022-12-15 RX ORDER — BENAZEPRIL HYDROCHLORIDE 40 MG/1
40 TABLET ORAL EVERY MORNING
COMMUNITY

## 2022-12-15 RX ORDER — EMPAGLIFLOZIN, METFORMIN HYDROCHLORIDE 12.5; 1 MG/1; MG/1
TABLET, EXTENDED RELEASE ORAL DAILY
COMMUNITY
Start: 2022-08-29

## 2022-12-15 RX ORDER — INDAPAMIDE 2.5 MG/1
2.5 TABLET ORAL EVERY MORNING
COMMUNITY
Start: 2022-09-30

## 2022-12-15 RX ORDER — PRAVASTATIN SODIUM 20 MG
20 TABLET ORAL DAILY
COMMUNITY

## 2022-12-21 ENCOUNTER — SLEEP STUDY (OUTPATIENT)
Dept: SLEEP MEDICINE | Facility: MEDICAL CENTER | Age: 77
End: 2022-12-21
Attending: PREVENTIVE MEDICINE
Payer: MEDICARE

## 2022-12-21 DIAGNOSIS — R06.83 SNORING: ICD-10-CM

## 2022-12-21 DIAGNOSIS — R53.82 CHRONIC FATIGUE, UNSPECIFIED: ICD-10-CM

## 2022-12-21 DIAGNOSIS — G47.20 SLEEP STAGE DYSFUNCTION: ICD-10-CM

## 2022-12-21 DIAGNOSIS — R06.81 WITNESSED EPISODE OF APNEA: ICD-10-CM

## 2022-12-21 DIAGNOSIS — R41.3 MEMORY CHANGE: ICD-10-CM

## 2022-12-21 DIAGNOSIS — G47.30 BREATHING-RELATED SLEEP DISORDER: ICD-10-CM

## 2022-12-21 PROCEDURE — 95811 POLYSOM 6/>YRS CPAP 4/> PARM: CPT | Performed by: PREVENTIVE MEDICINE

## 2022-12-27 NOTE — PROCEDURES
MONTAGE: Standard  STUDY TYPE: Split Night    RECORDING TECHNIQUE:   After the scalp was prepared, gold plated electrodes were applied to the scalp according to the International 10-20 System. EEG (electroencephalogram) was continuously monitored from the O1-M2, O2-M1, C3-M2, C4-M1, F3-M2, and F4-M1. EOGs (electrooculograms) were monitored by electrodes placed at the left and right outer canthi. Chin EMG (electromyogram) was monitored by electrodes placed on the mentalis and sub-mentalis muscles. Nasal and oral airflow were monitored using a triple port thermocouple as well as oronasal pressure transducer. Respiratory effort was measured by inductive plethysmography technology employing abdominal and thoracic belts. Blood oxygen saturation and pulse were monitored by pulse oximetry. Heart rhythm was monitored by surface electrocardiogram. Leg EMG was studied using surface electrodes placed on left and right anterior tibialis. A microphone was used to monitor tracheal sounds and snoring. Body position was monitored and documented by technician observation.   SCORING CRITERIA:   A modification of the AASM manual for scoring of sleep and associated events was used. Obstructive apneas were scored by cessation of airflow for at least 10 seconds with continuing respiratory effort. Central apneas were scored by cessation of airflow for at least 10 seconds with no respiratory effort. Hypopneas were scored by a 30% or more reduction in airflow for at least 10 seconds accompanied by arterial oxygen desaturation of 3% or an arousal. For CMS (Medicare) patients, per AASM rule 1B, hypopneas are scored by 30% with mild reduction in airflow for at least 10 seconds accompanied by arterial saturation decreased at 4%.      DIAGNOSTIC  Study start time was 09:25:52 PM. Diagnostic recording time was 252 minutes with a total sleep time of 171 minutes resulting in a sleep efficiency of 68.06%%. Sleep latency from the start of the study  was 42 minutes and the latency from sleep to REM was 00 minutes. In total,158 arousals were scored for an arousal index of 55.3.  Respiratory:  There were a total of 5 apneas consisting of 3 obstructive apneas, 0 mixed apneas, and 2 central apneas. A total of 134 hypopneas were scored. The apnea index was 1.75 per hour and the hypopnea index was 46.88 per hour resulting in an overall AHI of 48.63. AHI during rem was 0.0 and AHI while supine was 70.21.  Oximetry:  There was a mean oxygen saturation of 89.0%. The minimum oxygen saturation during NREM sleep was 81.0% and in REM was --. Time spent during sleep with oxygen saturations <88% was 102.3 minutes.   Cardiac:  The highest heart rate seen while awake was 80 BPM while the highest heart rate during sleep was 83 BPM with an average sleeping heart rate of 55 BPM.  Limb Movements:  There were a total of 261 PLMs during sleep, which resulted in a PLM index of 91.3. There were 60 PLMs associated with arousals which resulted in a PLMS arousal index of 21.0.      TREATMENT:  Treatment recording time was 4h 29.5m (269 minutes) with a total sleep time of 3h 56.0m (236 minutes) resulting in a sleep efficiency of 87.6%. Sleep latency from the start of treatment was 05 minutes and REM latency from sleep onset was 1h 9.0m. The patient had 95 arousals in total for an arousal index of 24.2.  Respiratory:   There were 9 apneas in total consisting of 5 obstructive apneas, 4 central apneas, and 0 mixed apneas for an apnea index of 2.29. The patient had 57 hypopneas in total, which resulted in a hypopnea index of 14.49. The overall AHI was 16.78, with a REM AHI of 17.37, and a supine AHI of 25.55.   Oximetry:  The mean SaO2 during treatment was 91.0%. The minimum oxygen saturation in NREM was 82.0 % and in REM was 80.0%. Patient spent 19.4 minutes of TST with SaO2 <88%.  Cardiac:  The highest heart rate during sleep was 71 BPM with an average sleeping heart rate of 52 BPM.  Limb  Movements:  There were a total of 15 PLMS during titration sleep time that resulted in an index of 3.8. There were 20 PLMS associated with arousals. This resulted in a PLM arousal index of 5.1.      Titration: CPAP was tried from 7-13cm H2O.  This was a fully attended sleep study. This test was technically adequate. This patient was titrated on CPAP starting at 7 cm of water pressure. Patient was titrated up to 13 cm of water pressure. Patient did best at 13 cm of water pressure. Patient spent 64 minutes at that pressure and their AHI was 5.66 which is considered mild obstructive sleep apnea.     Assessment:   DIAGNOSTIC: Severe obstructive Sleep Apnea Hypopnea - AHI 46.88, severe supine sleep apnea AHI 70.21.  Moderate nocturnal desaturation - guerrero saturation 81% - saturations <88% below for 102.3 minutes of TST.  TREATMENT: Moderate obstructive Sleep Apnea Hypopnea - AHI 16.8, moderate supine sleep apnea AHI 25.5.  Mild to moderate nocturnal oxygen desaturation - guerrero saturation 80% - saturations <88% below for 19.4 minutes of TST.    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  The limb movements were reduced significantly during the titration portion of the study to an index of 3.8    Recommendation:   This patient will do well on a ResMed APAP machine with initial pressures of minimum pressure of 12 and a maximum pressure of 16 cmH2O.  Additionally oxygen supplementation will be needed in the form of 2 L of oxygen per minute as a bleed in to Pap therapy.  Careful mask fit and heated humidification must also be included in this prescription.  This patient will need to meet all insurance requirements for compliance.  Regarding the PLMS--this resolved with treatment clinical correlation is required.    HYPNOGRAM:

## 2023-01-05 ENCOUNTER — APPOINTMENT (RX ONLY)
Dept: URBAN - METROPOLITAN AREA CLINIC 31 | Facility: CLINIC | Age: 78
Setting detail: DERMATOLOGY
End: 2023-01-05

## 2023-01-05 DIAGNOSIS — L81.4 OTHER MELANIN HYPERPIGMENTATION: ICD-10-CM

## 2023-01-05 DIAGNOSIS — L82.1 OTHER SEBORRHEIC KERATOSIS: ICD-10-CM

## 2023-01-05 DIAGNOSIS — D18.0 HEMANGIOMA: ICD-10-CM

## 2023-01-05 DIAGNOSIS — D22 MELANOCYTIC NEVI: ICD-10-CM

## 2023-01-05 DIAGNOSIS — Z71.89 OTHER SPECIFIED COUNSELING: ICD-10-CM

## 2023-01-05 PROBLEM — D22.5 MELANOCYTIC NEVI OF TRUNK: Status: ACTIVE | Noted: 2023-01-05

## 2023-01-05 PROBLEM — D22.72 MELANOCYTIC NEVI OF LEFT LOWER LIMB, INCLUDING HIP: Status: ACTIVE | Noted: 2023-01-05

## 2023-01-05 PROBLEM — D22.71 MELANOCYTIC NEVI OF RIGHT LOWER LIMB, INCLUDING HIP: Status: ACTIVE | Noted: 2023-01-05

## 2023-01-05 PROBLEM — D18.01 HEMANGIOMA OF SKIN AND SUBCUTANEOUS TISSUE: Status: ACTIVE | Noted: 2023-01-05

## 2023-01-05 PROBLEM — D22.61 MELANOCYTIC NEVI OF RIGHT UPPER LIMB, INCLUDING SHOULDER: Status: ACTIVE | Noted: 2023-01-05

## 2023-01-05 PROBLEM — D22.62 MELANOCYTIC NEVI OF LEFT UPPER LIMB, INCLUDING SHOULDER: Status: ACTIVE | Noted: 2023-01-05

## 2023-01-05 PROCEDURE — ? COUNSELING

## 2023-01-05 PROCEDURE — ? REFERRAL

## 2023-01-05 PROCEDURE — 99213 OFFICE O/P EST LOW 20 MIN: CPT

## 2023-01-05 ASSESSMENT — LOCATION SIMPLE DESCRIPTION DERM
LOCATION SIMPLE: ABDOMEN
LOCATION SIMPLE: LEFT UPPER ARM
LOCATION SIMPLE: LEFT INFERIOR EYELID
LOCATION SIMPLE: LEFT FOREARM
LOCATION SIMPLE: UPPER BACK
LOCATION SIMPLE: LEFT HAND
LOCATION SIMPLE: RIGHT HAND
LOCATION SIMPLE: RIGHT CALF
LOCATION SIMPLE: LEFT CALF
LOCATION SIMPLE: LEFT THIGH
LOCATION SIMPLE: RIGHT UPPER BACK
LOCATION SIMPLE: RIGHT FOREARM
LOCATION SIMPLE: RIGHT THIGH
LOCATION SIMPLE: RIGHT LOWER BACK
LOCATION SIMPLE: RIGHT UPPER ARM
LOCATION SIMPLE: RIGHT SHOULDER
LOCATION SIMPLE: RIGHT CHEEK
LOCATION SIMPLE: LEFT CHEEK
LOCATION SIMPLE: LEFT SHOULDER

## 2023-01-05 ASSESSMENT — LOCATION DETAILED DESCRIPTION DERM
LOCATION DETAILED: RIGHT VENTRAL DISTAL FOREARM
LOCATION DETAILED: LEFT RADIAL DORSAL HAND
LOCATION DETAILED: RIGHT PROXIMAL CALF
LOCATION DETAILED: LEFT LATERAL MALAR CHEEK
LOCATION DETAILED: RIGHT CENTRAL MALAR CHEEK
LOCATION DETAILED: INFERIOR THORACIC SPINE
LOCATION DETAILED: LEFT PROXIMAL POSTERIOR UPPER ARM
LOCATION DETAILED: RIGHT ANTERIOR DISTAL UPPER ARM
LOCATION DETAILED: RIGHT INFERIOR MEDIAL UPPER BACK
LOCATION DETAILED: PERIUMBILICAL SKIN
LOCATION DETAILED: LEFT PROXIMAL DORSAL FOREARM
LOCATION DETAILED: LEFT ANTERIOR DISTAL UPPER ARM
LOCATION DETAILED: RIGHT PROXIMAL POSTERIOR UPPER ARM
LOCATION DETAILED: LEFT ANTERIOR DISTAL THIGH
LOCATION DETAILED: EPIGASTRIC SKIN
LOCATION DETAILED: RIGHT SUPERIOR MEDIAL MIDBACK
LOCATION DETAILED: LEFT POSTERIOR SHOULDER
LOCATION DETAILED: LEFT LATERAL INFERIOR EYELID
LOCATION DETAILED: RIGHT POSTERIOR SHOULDER
LOCATION DETAILED: RIGHT ANTERIOR DISTAL THIGH
LOCATION DETAILED: LEFT VENTRAL DISTAL FOREARM
LOCATION DETAILED: RIGHT ULNAR DORSAL HAND
LOCATION DETAILED: LEFT PROXIMAL CALF
LOCATION DETAILED: RIGHT PROXIMAL DORSAL FOREARM

## 2023-01-05 ASSESSMENT — LOCATION ZONE DERM
LOCATION ZONE: EYELID
LOCATION ZONE: ARM
LOCATION ZONE: TRUNK
LOCATION ZONE: HAND
LOCATION ZONE: FACE
LOCATION ZONE: LEG

## 2023-01-05 NOTE — HPI: FULL BODY SKIN EXAMINATION
How Severe Are Your Spot(S)?: moderate
SUBJECTIVE: Flora Miranda is a 64 year old female here with concerns about sinus infection.  She states onset  of symptoms were greater than 10 days with sinus pressure and drainage.  Course of illness is worsening.    Severity: moderate  Current and Associated symptoms: cold symptoms  Predisposing factors include hx of sinusitis.     No past medical history on file.  No Known Allergies  Social History     Tobacco Use     Smoking status: Current Every Day Smoker     Packs/day: 0.50     Years: 45.00     Pack years: 22.50     Smokeless tobacco: Never Used   Substance Use Topics     Alcohol use: Yes     Comment: rarely       ROS:   no rash  no vomiting    OBJECTIVE:  /78   Pulse 89   Temp 98.5  F (36.9  C)   Resp 20   Wt 115.2 kg (254 lb)   SpO2 95%   BMI 44.99 kg/m    NAD  EYES: clear, no mattering  EARS: TM's clear, no redness/buldging, canals clear, no swelling/redness  NOSE: discolored discharge erna. Nares  THROAT: clear, no erythema, no exudate  SINUS: maxillary tenderness with palpation  LUNGS: CTAB, no rhonchi/wheeze/rales      ICD-10-CM    1. Acute sinusitis with coexisting condition requiring prophylactic treatment  J01.90 amoxicillin-clavulanate (AUGMENTIN) 875-125 MG tablet       Follow up with primary clinic if not improving    
What Type Of Note Output Would You Prefer (Optional)?: Standard Output
What Is The Reason For Today's Visit?: Full Body Skin Examination
What Is The Reason For Today's Visit? (Being Monitored For X): concerning skin lesions on an annual basis

## 2023-01-17 NOTE — PROGRESS NOTES
[facility] Follow-up Visit    Date of Visit: 1/18/2023     CC:  Follow-up for RAD management      HPI:  Meme Shelton is a very pleasant 78 y.o. year old female former smoker, with a PMHx of suspected RAD who presented to the Sleep Clinic for a regular follow up. Last seen in the office on 12/2/2022 with Dr. Carlos.    Patient presents for sleep study results.  Patient originally had a sleep study done at [place] in July/August 2022 she was then started on CPAP in September 2022, which she did not tolerate due to claustrophobia.  She presents today for a repeat sleep study to be evaluated for the inspire device.  Patient did have some dream reenactment while using Advil PM, but since using melatonin she has not had any of these recurrences.  She does have issues falling asleep which she takes REM fresh 1 mg for which seems to be helpful.  She does report some snoring and some apneas as well as dry mouth.  She denies any significant morning headaches, daytime/driving drowsy, gasping, palpitations, or any symptoms of RLS, narcolepsy or any nightmares, sleep walking or acting out of dreams.  She goes to bed at 9 PM and wakes up between 730-8 AM.  She awakens 1 to the bathroom is having issues 5 back asleep.  She will nap 5 out of 7 days a week for about an hour.     Sleep History:  PSG 12/21/2022-        Patient Active Problem List    Diagnosis Date Noted    RAD (obstructive sleep apnea) 01/18/2023    Nocturnal hypoxia 01/18/2023     History reviewed. No pertinent past medical history.   History reviewed. No pertinent surgical history.  History reviewed. No pertinent family history.  Social History     Socioeconomic History    Marital status:      Spouse name: Not on file    Number of children: Not on file    Years of education: Not on file    Highest education level: Not on file   Occupational History    Not on file   Tobacco Use    Smoking status: Former     Types: Cigarettes    Smokeless tobacco:  Never   Vaping Use    Vaping Use: Never used   Substance and Sexual Activity    Alcohol use: Yes     Comment: 2 glasses a week    Drug use: Never    Sexual activity: Not on file   Other Topics Concern    Not on file   Social History Narrative    Not on file     Social Determinants of Health     Financial Resource Strain: Not on file   Food Insecurity: Not on file   Transportation Needs: Not on file   Physical Activity: Not on file   Stress: Not on file   Social Connections: Not on file   Intimate Partner Violence: Not on file   Housing Stability: Not on file     Current Outpatient Medications   Medication Sig Dispense Refill    benazepril (LOTENSIN) 40 MG tablet 1 tab(s)      Empagliflozin-metFORMIN HCl ER (SYNJARDY XR) 12.5-1000 MG TABLET SR 24 HR 2 tab(s)      pravastatin (PRAVACHOL) 20 MG Tab 1 tab(s)      indapamide (LOZOL) 2.5 MG Tab Take 2.5 mg by mouth every morning.      zolpidem (AMBIEN) 5 MG Tab Take 1 Tablet by mouth at bedtime as needed for Sleep for up to 1 dose. On day supply 1 Tablet 0    dilTIAZem HCl ER Beads (TAZTIA XT PO) Take  by mouth.      Cholecalciferol (D3 5000 PO) Take  by mouth.      SITagliptin Phosphate (JANUVIA PO) Take  by mouth.      omeprazole (PRILOSEC) 20 MG delayed-release capsule Take 20 mg by mouth every day.      FLUOXETINE HCL PO Take  by mouth.       No current facility-administered medications for this visit.      ALLERGIES: Patient has no known allergies.    ROS:  Constitutional: Denies fever, chills, sweats,  weight loss, fatigue  Cardiovascular: Denies chest pain, tightness, palpitations, swelling in legs/feet  Respiratory: Denies shortness of breath, cough, sputum, wheezing, painful breathing   Sleep: per HPI  Gastrointestinal: Denies  difficulty swallowing, nausea, abdominal pain, diarrhea, constipation, heartburn.  Musculoskeletal: Denies painful joints, sore muscles,       PHYSICAL EXAM:  /84 (BP Location: Right arm, Patient Position: Sitting, BP Cuff Size:  "Adult)   Pulse (!) 57   Resp 16   Ht 1.753 m (5' 9\")   Wt 75.8 kg (167 lb)   SpO2 94% Comment: room air at rest  BMI 24.66 kg/m²   Appearance: Well-nourished, well-developed, no acute distress  Eyes:  No scleral icterus , EOMI  ENMT: No redness of the oropharynx  Lung auscultation:  No wheezes rhonchi rubs or rales  Cardiac: No murmurs, rubs, or gallops; regular rhythm, normal rate; no edema  Musculoskeletal:  Grossly normal; gait and station normal; digits and nails normal  Skin:  No rashes, petechiae, cyanosis  Neurologic: without focal signs; oriented to person, time, place, and purpose; judgement intact  Psychiatric:  No depression, anxiety, agitation      Assessment and Plan:    The medical record was reviewed.    Diagnostic and titration nocturnal polysomnogram's, home sleep apnea tests, continuous nocturnal oximetry results, multiple sleep latency tests, and compliance reports reviewed.    Problem List Items Addressed This Visit       RAD (obstructive sleep apnea)     Sleep Apnea:    The pathophysiology of sleep anea and the increased risk of cardiovascular morbidity from untreated sleep apnea is discussed in detail with the patient.  Urged to avoid supine sleep, weight gain and alcoholic beverages since all of these can worsen sleep apnea. Cautioned against drowsy driving. If feeling sleepy while driving, pull over for a break/nap, rather than persist on the road, in the interest of own safety and that of others on the road.    PSG was reviewed and discussed with the patient.  Spent significant amount time defining what sleep apnea is, risks of untreated sleep apnea, and treatment options.  We also spent significant amount of time talking about the inspire device.  Patient is insistent on being evaluated for the inspire device and does not want to be started on CPAP due to claustrophobia.   Titration up to split-night did show the need for supplemental oxygen with CPAP therapy and may need supplemental " oxygen with Inspire, but she would still like to be evaluated for Inspire.      - Order placed for ENT (Dr. Thao) for Inspire  - Order placed for nocturnal O2 at 2 LPM with repeat OPO on 2 LPM while patient is evaluated for inspire device  - Advised patient to reach out via [email or web address] if any questions or concerns should arise.   The risks of untreated sleep apnea were discussed with the patient at length. Patients with sleep apnea are at increased risk of cardiovascular disease including coronary artery disease, systemic arterial hypertension, pulmonary arterial hypertension, cardiac arrythmias, and stroke. The patient was advised to avoid driving a motor vehicle when drowsy.    Positive airway pressure will favorably impact many of the adverse conditions and effects provoked by sleep apnea.         Relevant Orders    Referral to ENT    Nocturnal hypoxia     As above.         Relevant Orders    DME O2 New Set Up    Overnight Oximetry     Have advised the patient to follow up with the appropriate healthcare practitioners for all other medical problems and issues.    Return in about 1 year (around 1/18/2024), or if symptoms worsen or fail to improve.    Please note portions of this record was created using voice recognition software. I have made every reasonable attempt to correct obvious errors, but I expect that there are errors of grammar and possibly content I did not discover before finalizing the note.    Time spent in record review prior to patient arrival, reviewing results, and in face-to-face encounter totaled 35 min.  __________  HERVE Gama  Pulmonary & Sleep Medicine  [facility]

## 2023-01-18 ENCOUNTER — OFFICE VISIT (OUTPATIENT)
Dept: SLEEP MEDICINE | Facility: MEDICAL CENTER | Age: 78
End: 2023-01-18
Payer: MEDICARE

## 2023-01-18 VITALS
RESPIRATION RATE: 16 BRPM | OXYGEN SATURATION: 94 % | SYSTOLIC BLOOD PRESSURE: 122 MMHG | HEART RATE: 57 BPM | DIASTOLIC BLOOD PRESSURE: 84 MMHG | BODY MASS INDEX: 24.73 KG/M2 | HEIGHT: 69 IN | WEIGHT: 167 LBS

## 2023-01-18 DIAGNOSIS — G47.34 NOCTURNAL HYPOXIA: ICD-10-CM

## 2023-01-18 DIAGNOSIS — G47.33 OSA (OBSTRUCTIVE SLEEP APNEA): ICD-10-CM

## 2023-01-18 PROCEDURE — 99214 OFFICE O/P EST MOD 30 MIN: CPT

## 2023-01-18 NOTE — ASSESSMENT & PLAN NOTE
Sleep Apnea:    The pathophysiology of sleep anea and the increased risk of cardiovascular morbidity from untreated sleep apnea is discussed in detail with the patient.  Urged to avoid supine sleep, weight gain and alcoholic beverages since all of these can worsen sleep apnea. Cautioned against drowsy driving. If feeling sleepy while driving, pull over for a break/nap, rather than persist on the road, in the interest of own safety and that of others on the road.    PSG was reviewed and discussed with the patient.  Spent significant amount time defining what sleep apnea is, risks of untreated sleep apnea, and treatment options.  We also spent significant amount of time talking about the inspire device.  Patient is insistent on being evaluated for the inspire device and does not want to be started on CPAP due to claustrophobia.   Titration up to split-night did show the need for supplemental oxygen with CPAP therapy and may need supplemental oxygen with Inspire, but she would still like to be evaluated for Inspire.      - Order placed for ENT (Dr. Thao) for Inspire  - Order placed for nocturnal O2 at 2 LPM with repeat OPO on 2 LPM while patient is evaluated for inspire device  - Advised patient to reach out via [facility] if any questions or concerns should arise.   The risks of untreated sleep apnea were discussed with the patient at length. Patients with sleep apnea are at increased risk of cardiovascular disease including coronary artery disease, systemic arterial hypertension, pulmonary arterial hypertension, cardiac arrythmias, and stroke. The patient was advised to avoid driving a motor vehicle when drowsy.    Positive airway pressure will favorably impact many of the adverse conditions and effects provoked by sleep apnea.

## 2023-02-01 ENCOUNTER — TELEPHONE (OUTPATIENT)
Dept: HEALTH INFORMATION MANAGEMENT | Facility: OTHER | Age: 78
End: 2023-02-01
Payer: MEDICARE

## 2023-03-16 ENCOUNTER — APPOINTMENT (OUTPATIENT)
Dept: ADMISSIONS | Facility: MEDICAL CENTER | Age: 78
End: 2023-03-16
Attending: OTOLARYNGOLOGY
Payer: MEDICARE

## 2023-03-20 ENCOUNTER — PRE-ADMISSION TESTING (OUTPATIENT)
Dept: ADMISSIONS | Facility: MEDICAL CENTER | Age: 78
End: 2023-03-20
Attending: OTOLARYNGOLOGY
Payer: MEDICARE

## 2023-03-21 NOTE — PREPROCEDURE INSTRUCTIONS
I advised Meme and her  Glynn to refer to her prescribing doctor for instructions on Synjardy administration prior to surgery as anesthesia recommendations are to hold for 3 days prior to surgery. I also advised Meme to hold Januvia and indapamide the morning of surgery per anesthesia guidelines.

## 2023-03-21 NOTE — OR NURSING
RN telephone preadmission appointment completed with Meme Shelton and her  Glynn. Glynn states they are unable to come in for testing prior to surgery. I called [facility] where Glynn thought she had lab work done recently but they stated that they have no current labs within the past 60 days.

## 2023-03-23 ENCOUNTER — APPOINTMENT (RX ONLY)
Dept: URBAN - METROPOLITAN AREA CLINIC 31 | Facility: CLINIC | Age: 78
Setting detail: DERMATOLOGY
End: 2023-03-23

## 2023-03-23 DIAGNOSIS — L82.1 OTHER SEBORRHEIC KERATOSIS: ICD-10-CM

## 2023-03-23 PROCEDURE — ? COUNSELING

## 2023-03-23 PROCEDURE — ? BENIGN DESTRUCTION COSMETIC

## 2023-03-23 ASSESSMENT — LOCATION DETAILED DESCRIPTION DERM
LOCATION DETAILED: LEFT SUPERIOR CENTRAL MALAR CHEEK
LOCATION DETAILED: LEFT LATERAL INFERIOR EYELID

## 2023-03-23 ASSESSMENT — LOCATION ZONE DERM
LOCATION ZONE: FACE
LOCATION ZONE: EYELID

## 2023-03-23 ASSESSMENT — LOCATION SIMPLE DESCRIPTION DERM
LOCATION SIMPLE: LEFT CHEEK
LOCATION SIMPLE: LEFT INFERIOR EYELID

## 2023-03-23 NOTE — PROCEDURE: BENIGN DESTRUCTION COSMETIC
Price (Use Numbers Only, No Special Characters Or $): 50
Anesthesia Volume In Cc: 0.5
Detail Level: Detailed
Consent: The patient's consent was obtained including but not limited to risks of crusting, scabbing, blistering, scarring, darker or lighter pigmentary change, recurrence, incomplete removal and infection.
Post-Care Instructions: I reviewed with the patient in detail post-care instructions. Patient is to wear sunprotection, and avoid picking at any of the treated lesions. Pt may apply Vaseline to crusted or scabbing areas.

## 2023-04-04 ENCOUNTER — ANESTHESIA (OUTPATIENT)
Dept: SURGERY | Facility: MEDICAL CENTER | Age: 78
End: 2023-04-04
Payer: MEDICARE

## 2023-04-04 ENCOUNTER — ANESTHESIA EVENT (OUTPATIENT)
Dept: SURGERY | Facility: MEDICAL CENTER | Age: 78
End: 2023-04-04
Payer: MEDICARE

## 2023-04-04 ENCOUNTER — HOSPITAL ENCOUNTER (OUTPATIENT)
Facility: MEDICAL CENTER | Age: 78
End: 2023-04-04
Attending: OTOLARYNGOLOGY | Admitting: OTOLARYNGOLOGY
Payer: MEDICARE

## 2023-04-04 VITALS
HEART RATE: 51 BPM | SYSTOLIC BLOOD PRESSURE: 158 MMHG | TEMPERATURE: 97 F | OXYGEN SATURATION: 92 % | HEIGHT: 68 IN | RESPIRATION RATE: 18 BRPM | DIASTOLIC BLOOD PRESSURE: 68 MMHG | WEIGHT: 166.45 LBS | BODY MASS INDEX: 25.23 KG/M2

## 2023-04-04 LAB
ANION GAP SERPL CALC-SCNC: 10 MMOL/L (ref 7–16)
BUN SERPL-MCNC: 13 MG/DL (ref 8–22)
CALCIUM SERPL-MCNC: 8.5 MG/DL (ref 8.5–10.5)
CHLORIDE SERPL-SCNC: 105 MMOL/L (ref 96–112)
CO2 SERPL-SCNC: 26 MMOL/L (ref 20–33)
CREAT SERPL-MCNC: 0.54 MG/DL (ref 0.5–1.4)
EKG IMPRESSION: NORMAL
GFR SERPLBLD CREATININE-BSD FMLA CKD-EPI: 95 ML/MIN/1.73 M 2
GLUCOSE BLD STRIP.AUTO-MCNC: 184 MG/DL (ref 65–99)
GLUCOSE SERPL-MCNC: 236 MG/DL (ref 65–99)
POTASSIUM SERPL-SCNC: 3.5 MMOL/L (ref 3.6–5.5)
SODIUM SERPL-SCNC: 141 MMOL/L (ref 135–145)

## 2023-04-04 PROCEDURE — 93005 ELECTROCARDIOGRAM TRACING: CPT | Performed by: OTOLARYNGOLOGY

## 2023-04-04 PROCEDURE — 80048 BASIC METABOLIC PNL TOTAL CA: CPT

## 2023-04-04 PROCEDURE — 160025 RECOVERY II MINUTES (STATS): Performed by: OTOLARYNGOLOGY

## 2023-04-04 PROCEDURE — 160048 HCHG OR STATISTICAL LEVEL 1-5: Performed by: OTOLARYNGOLOGY

## 2023-04-04 PROCEDURE — A9270 NON-COVERED ITEM OR SERVICE: HCPCS | Performed by: OTOLARYNGOLOGY

## 2023-04-04 PROCEDURE — 36415 COLL VENOUS BLD VENIPUNCTURE: CPT

## 2023-04-04 PROCEDURE — 160046 HCHG PACU - 1ST 60 MINS PHASE II: Performed by: OTOLARYNGOLOGY

## 2023-04-04 PROCEDURE — 160035 HCHG PACU - 1ST 60 MINS PHASE I: Performed by: OTOLARYNGOLOGY

## 2023-04-04 PROCEDURE — 160002 HCHG RECOVERY MINUTES (STAT): Performed by: OTOLARYNGOLOGY

## 2023-04-04 PROCEDURE — 700111 HCHG RX REV CODE 636 W/ 250 OVERRIDE (IP): Performed by: ANESTHESIOLOGY

## 2023-04-04 PROCEDURE — 93010 ELECTROCARDIOGRAM REPORT: CPT | Performed by: INTERNAL MEDICINE

## 2023-04-04 PROCEDURE — 160009 HCHG ANES TIME/MIN: Performed by: OTOLARYNGOLOGY

## 2023-04-04 PROCEDURE — 82962 GLUCOSE BLOOD TEST: CPT

## 2023-04-04 PROCEDURE — 700105 HCHG RX REV CODE 258: Performed by: ANESTHESIOLOGY

## 2023-04-04 PROCEDURE — 700102 HCHG RX REV CODE 250 W/ 637 OVERRIDE(OP): Performed by: OTOLARYNGOLOGY

## 2023-04-04 PROCEDURE — 160028 HCHG SURGERY MINUTES - 1ST 30 MINS LEVEL 3: Performed by: OTOLARYNGOLOGY

## 2023-04-04 PROCEDURE — 00731 ANES UPR GI NDSC PX NOS: CPT | Performed by: ANESTHESIOLOGY

## 2023-04-04 PROCEDURE — 99100 ANES PT EXTEME AGE<1 YR&>70: CPT | Performed by: ANESTHESIOLOGY

## 2023-04-04 RX ORDER — SODIUM CHLORIDE, SODIUM LACTATE, POTASSIUM CHLORIDE, CALCIUM CHLORIDE 600; 310; 30; 20 MG/100ML; MG/100ML; MG/100ML; MG/100ML
INJECTION, SOLUTION INTRAVENOUS
Status: DISCONTINUED | OUTPATIENT
Start: 2023-04-04 | End: 2023-04-04 | Stop reason: SURG

## 2023-04-04 RX ORDER — UREA 10 %
0.5 LOTION (ML) TOPICAL NIGHTLY
COMMUNITY

## 2023-04-04 RX ORDER — ONDANSETRON 2 MG/ML
4 INJECTION INTRAMUSCULAR; INTRAVENOUS
Status: DISCONTINUED | OUTPATIENT
Start: 2023-04-04 | End: 2023-04-04 | Stop reason: HOSPADM

## 2023-04-04 RX ORDER — OXYMETAZOLINE HYDROCHLORIDE 0.05 G/100ML
2 SPRAY NASAL
Status: COMPLETED | OUTPATIENT
Start: 2023-04-04 | End: 2023-04-04

## 2023-04-04 RX ORDER — DIPHENHYDRAMINE HYDROCHLORIDE 50 MG/ML
12.5 INJECTION INTRAMUSCULAR; INTRAVENOUS
Status: DISCONTINUED | OUTPATIENT
Start: 2023-04-04 | End: 2023-04-04 | Stop reason: HOSPADM

## 2023-04-04 RX ORDER — HALOPERIDOL 5 MG/ML
1 INJECTION INTRAMUSCULAR
Status: DISCONTINUED | OUTPATIENT
Start: 2023-04-04 | End: 2023-04-04 | Stop reason: HOSPADM

## 2023-04-04 RX ADMIN — PROPOFOL 100 MCG/KG/MIN: 10 INJECTION, EMULSION INTRAVENOUS at 10:42

## 2023-04-04 RX ADMIN — SODIUM CHLORIDE, POTASSIUM CHLORIDE, SODIUM LACTATE AND CALCIUM CHLORIDE: 600; 310; 30; 20 INJECTION, SOLUTION INTRAVENOUS at 10:39

## 2023-04-04 RX ADMIN — OXYMETAZOLINE HCL 2 SPRAY: 0.05 SPRAY NASAL at 10:33

## 2023-04-04 ASSESSMENT — PAIN DESCRIPTION - PAIN TYPE
TYPE: SURGICAL PAIN

## 2023-04-04 ASSESSMENT — PAIN SCALES - GENERAL: PAIN_LEVEL: 0

## 2023-04-04 NOTE — OR NURSING
1125 Received report from Jazmin JARA. Patients  updated on recovery.     1138 Report to Jazmin GUTIERREZ RN in phase 2.    1142 To phase 2 by [place].

## 2023-04-04 NOTE — ANESTHESIA PREPROCEDURE EVALUATION
Case: [id number] Date/Time: 04/04/23 0800    Procedure: DRUG INDUCED SLEEP ENDOSCOPY (Nose)    Anesthesia type: General    Pre-op diagnosis: OBSTRUCTIVE SLEEP APNEA    Location: [facility] ROOM 22 / SURGERY SAME DAY [facility]    Surgeons: Gorge Reynoso M.D.          Relevant Problems   ANESTHESIA   (positive) RAD (obstructive sleep apnea)       Physical Exam    Airway   Mallampati: II  TM distance: >3 FB       Cardiovascular   Rhythm: regular  Rate: normal     Dental - normal exam           Pulmonary   Breath sounds clear to auscultation  (+) decreased breath sounds     Abdominal - normal exam     Neurological              Anesthesia Plan    ASA 2       Plan - MAC               Induction: intravenous      Pertinent diagnostic labs and testing reviewed    Informed Consent:    Anesthetic plan and risks discussed with patient and spouse.    Use of blood products discussed with: whom consented to blood products.

## 2023-04-04 NOTE — ANESTHESIA TIME REPORT
Anesthesia Start and Stop Event Times     Date Time Event    4/4/2023 1035 Ready for Procedure     1039 Anesthesia Start     1101 Anesthesia Stop        Responsible Staff  04/04/23    Name Role Begin End    Kaelyn Bradford M.D. Anesth 1039 1101        Overtime Reason:  no overtime (within assigned shift)    Comments:

## 2023-04-04 NOTE — OP REPORT
DATE OF SERVICE: April 4, 2023    SURGEON: Gorge Reynoso MD    ANESTHESIOLOGIST: Kaelyn Le sleep study from December 21, 2022 shows apnea hypopnea index of 48.6 with 80% of her sleep spent less than 90% oxygen saturation MD zane     PREOPERATIVE DIAGNOSES:  1.  Obstructive sleep apnea     POSTOPERATIVE DIAGNOSES:  1.  Obstructive sleep apnea     PROCEDURE PERFORMED: Drug-induced sleep endoscopy.     INDICATIONS FOR PROCEDURE: Sleep study from December 21, 2022 shows apnea hypopnea index of 48.6 with 85% of her sleep time spent less than 90% oxygen saturation.  Risks, benefits and alternatives were discussed with the parents who gave their informed consent.     OPERATIVE FINDINGS: Majority anteroposterior collapse.  The patient is an anatomic candidate for the inspire surgery.     PROCEDURE IN DETAIL:  The patient was identified in preoperative holding area   and brought to the operating room.  A timeout was satisfactorily completed.  A 10 mg bolus of propofol was given and then an infusion pump was started at 100 mcg/kg/min.  The dosage was increased by 25 mcg/kg/min every 2 minutes until snoring commenced.  Flexible laryngoscopy was performed to visualize at which location the patient's upper airway collapsed.  The patient was extubated and brought to the postanesthesia care unit in good condition.     ESTIMATED BLOOD LOSS:  0 mL.     COMPLICATIONS:  None.     SPECIMENS REMOVED: None

## 2023-04-04 NOTE — OR NURSING
1058 Pt to PACU from OR. Report from anesthesia and OR RN. Arouses on arrival to PACU, on 2L NC O2. Respirations even and unlabored. VSS. Declines pain/nausea.

## 2023-04-04 NOTE — OR NURSING
1149 - Recieved report from Thea JARA. Patient arrived to phase II bay 21, now sitting in a recliner. No signs of bleeding. Drinking water with no problems.    1210 - Discharge instructions discussed with Glynn, . All questions answered. Verbalized understanding.     1215 - Pt escorted out of department in wheelchair with all belongings. Discharged home to responsible adult. PIV removed with tip intact.

## 2023-04-04 NOTE — DISCHARGE INSTRUCTIONS
HOME CARE INSTRUCTIONS    ACTIVITY: Rest and take it easy for the first 24 hours.  A responsible adult is recommended to remain with you during that time.  It is normal to feel sleepy.  We encourage you to not do anything that requires balance, judgment or coordination.    FOR 24 HOURS DO NOT:  Drive, operate machinery or run household appliances.  Drink beer or alcoholic beverages.  Make important decisions or sign legal documents.    SPECIAL INSTRUCTIONS: Ok to shower tomorrow and resume normal activities    DIET: To avoid nausea, slowly advance diet as tolerated, avoiding spicy or greasy foods for the first day.  Add more substantial food to your diet according to your physician's instructions.  Babies can be fed formula or breast milk as soon as they are hungry.  INCREASE FLUIDS AND FIBER TO AVOID CONSTIPATION.    MEDICATIONS: Resume taking daily medication.  Take prescribed pain medication with food.  If no medication is prescribed, you may take non-aspirin pain medication if needed.  PAIN MEDICATION CAN BE VERY CONSTIPATING.  Take a stool softener or laxative such as senokot, pericolace, or milk of magnesia if needed.    A follow-up appointment should be arranged with your doctor; call to schedule.    You should CALL YOUR PHYSICIAN if you develop:  Fever greater than 101 degrees F.  Pain not relieved by medication, or persistent nausea or vomiting.  Excessive bleeding (blood soaking through dressing) or unexpected drainage from the wound.  Extreme redness or swelling around the incision site, drainage of pus or foul smelling drainage.  Inability to urinate or empty your bladder within 8 hours.  Problems with breathing or chest pain.    You should call 911 if you develop problems with breathing or chest pain.  If you are unable to contact your doctor or surgical center, you should go to the nearest emergency room or urgent care center.    Physician's telephone #: Dr. Reynoso [phone]    MILD FLU-LIKE  SYMPTOMS ARE NORMAL.  YOU MAY EXPERIENCE GENERALIZED MUSCLE ACHES, THROAT IRRITATION, HEADACHE AND/OR SOME NAUSEA.    If any questions arise, call your doctor.  If your doctor is not available, please feel free to call the Surgical Center at [phone].  The Center is open Monday through Friday from 7AM to 7PM.      A registered nurse may call you a few days after your surgery to see how you are doing after your procedure.    You may also receive a survey in the mail within the next two weeks and we ask that you take a few moments to complete the survey and return it to us.  Our goal is to provide you with very good care and we value your comments.     Depression / Suicide Risk    As you are discharged from this [facility] facility, it is important to learn how to keep safe from harming yourself.    Recognize the warning signs:  Abrupt changes in personality, positive or negative- including increase in energy   Giving away possessions  Change in eating patterns- significant weight changes-  positive or negative  Change in sleeping patterns- unable to sleep or sleeping all the time   Unwillingness or inability to communicate  Depression  Unusual sadness, discouragement and loneliness  Talk of wanting to die  Neglect of personal appearance   Rebelliousness- reckless behavior  Withdrawal from people/activities they love  Confusion- inability to concentrate     If you or a loved one observes any of these behaviors or has concerns about self-harm, here's what you can do:  Talk about it- your feelings and reasons for harming yourself  Remove any means that you might use to hurt yourself (examples: pills, rope, extension cords, firearm)  Get professional help from the community (Mental Health, Substance Abuse, psychological counseling)  Do not be alone:Call your Safe Contact- someone whom you trust who will be there for you.  Call your local CRISIS HOTLINE 086-4638 or 715-327-9243  Call your local Children's Mobile  Crisis Response Team [place] (209) 967-2609 or www.[employer].Ail[employer]ing net  Call the toll free National Suicide Prevention Hotlines   National Suicide Prevention Lifeline 063-315-IUYA (3794)  [facility] Line Network 800-SUICIDE (616-7311)    I acknowledge receipt and understanding of these Home Care instructions.

## 2023-04-04 NOTE — ANESTHESIA POSTPROCEDURE EVALUATION
Patient: Meme Shelton    Procedure Summary     Date: 04/04/23 Room / Location: [facility] ROOM 22 / SURGERY SAME DAY [facility]    Anesthesia Start: 1039 Anesthesia Stop: 1101    Procedure: DRUG INDUCED SLEEP ENDOSCOPY (Nose) Diagnosis: (OBSTRUCTIVE SLEEP APNEA)    Surgeons: Gorge Reynoso M.D. Responsible Provider: Kaelyn Bradford M.D.    Anesthesia Type: MAC ASA Status: 2          Final Anesthesia Type: MAC  Last vitals  BP   Blood Pressure : (!) 158/68    Temp   36.1 °C (97 °F)    Pulse   (!) 51   Resp   18    SpO2   92 %      Anesthesia Post Evaluation    Patient location during evaluation: PACU  Patient participation: complete - patient participated  Level of consciousness: awake and alert  Pain score: 0    Airway patency: patent  Anesthetic complications: no  Cardiovascular status: adequate and hemodynamically stable  Respiratory status: acceptable and nasal cannula  Hydration status: acceptable    PONV: none          No notable events documented.     Nurse Pain Score: 0 (NPRS)

## 2023-04-13 ENCOUNTER — TELEPHONE (OUTPATIENT)
Dept: SLEEP MEDICINE | Facility: MEDICAL CENTER | Age: 78
End: 2023-04-13
Payer: MEDICARE

## 2023-04-13 NOTE — TELEPHONE ENCOUNTER
Patients spouse Glynn calling regarding OPO scheduled for 04/20/2023, states Meme has completed a sleep study already and the inspire insertion has already been approved and scheduled for 06/15/2023, and that is what all the testing was for.    States insurance will not cover this test would like a call to discuss the following:    Is the opo required since the inspire is already approved?    What additional information would be gained?    Clarification on the difference between the sleep study and OPO.

## 2023-04-20 ENCOUNTER — APPOINTMENT (OUTPATIENT)
Dept: ADMISSIONS | Facility: MEDICAL CENTER | Age: 78
End: 2023-04-20
Attending: OTOLARYNGOLOGY
Payer: MEDICARE

## 2023-04-20 ENCOUNTER — HOME STUDY (OUTPATIENT)
Dept: SLEEP MEDICINE | Facility: MEDICAL CENTER | Age: 78
End: 2023-04-20
Payer: MEDICARE

## 2023-04-20 DIAGNOSIS — G47.34 NOCTURNAL HYPOXIA: ICD-10-CM

## 2023-04-20 PROCEDURE — 94762 N-INVAS EAR/PLS OXIMTRY CONT: CPT

## 2023-04-20 NOTE — TELEPHONE ENCOUNTER
Patient has already picked up OPO device today 04/20/2023 .     Order placed for nocturnal O2 at 2 LPM with repeat OPO on 2 LPM while patient is evaluated for inspire device    Spoke with patients  and stated they were informed the difference between OPO and HST

## 2023-05-01 NOTE — PROCEDURES
Overnight Pulse Oximetry    Interpretation:    This overnight oximetry was recorded on 4/20/2023 with the patient on supplemental oxygen 2 L/min.  The analysis duration was 8hrs 14min.   The saturations were less than 90% for 0.1% of the recording.  Basal oxygen saturation of 95.3%. The guerrero saturation was 87% .  The patient spent 0.1 minutes with saturations < 88%.  Overall, this continuous nocturnal oximetry demonstrates unremarkable study while on supplemental oxygen 2 L/min.    Recommendation:  Continue current therapy  Clinical correlation is needed.

## 2023-05-01 NOTE — RESULT ENCOUNTER NOTE
Called patient and  and reviewed results.  There were no questions.  Advised patient to reach out if she has any questions or issues that arise.

## 2023-05-02 ENCOUNTER — PRE-ADMISSION TESTING (OUTPATIENT)
Dept: ADMISSIONS | Facility: MEDICAL CENTER | Age: 78
End: 2023-05-02
Attending: OTOLARYNGOLOGY
Payer: MEDICARE

## 2023-05-02 RX ORDER — FLUOXETINE HYDROCHLORIDE 20 MG/1
20 CAPSULE ORAL DAILY
COMMUNITY
Start: 2023-03-17

## 2023-05-02 RX ORDER — INSULIN GLARGINE 300 U/ML
22 INJECTION, SOLUTION SUBCUTANEOUS EVERY MORNING
COMMUNITY
Start: 2023-03-22

## 2023-05-02 RX ORDER — CYANOCOBALAMIN 1000 UG/ML
INJECTION INTRAMUSCULAR; SUBCUTANEOUS
COMMUNITY
Start: 2023-04-27

## 2023-06-15 ENCOUNTER — APPOINTMENT (OUTPATIENT)
Dept: RADIOLOGY | Facility: MEDICAL CENTER | Age: 78
End: 2023-06-15
Attending: OTOLARYNGOLOGY
Payer: MEDICARE

## 2023-06-15 ENCOUNTER — HOSPITAL ENCOUNTER (OUTPATIENT)
Facility: MEDICAL CENTER | Age: 78
End: 2023-06-15
Attending: OTOLARYNGOLOGY | Admitting: OTOLARYNGOLOGY
Payer: MEDICARE

## 2023-06-15 ENCOUNTER — ANESTHESIA EVENT (OUTPATIENT)
Dept: SURGERY | Facility: MEDICAL CENTER | Age: 78
End: 2023-06-15
Payer: MEDICARE

## 2023-06-15 ENCOUNTER — ANESTHESIA (OUTPATIENT)
Dept: SURGERY | Facility: MEDICAL CENTER | Age: 78
End: 2023-06-15
Payer: MEDICARE

## 2023-06-15 VITALS
OXYGEN SATURATION: 98 % | DIASTOLIC BLOOD PRESSURE: 58 MMHG | HEIGHT: 69 IN | HEART RATE: 65 BPM | RESPIRATION RATE: 16 BRPM | TEMPERATURE: 98.5 F | BODY MASS INDEX: 24.07 KG/M2 | WEIGHT: 162.48 LBS | SYSTOLIC BLOOD PRESSURE: 127 MMHG

## 2023-06-15 LAB — GLUCOSE BLD STRIP.AUTO-MCNC: 120 MG/DL (ref 65–99)

## 2023-06-15 PROCEDURE — 160025 RECOVERY II MINUTES (STATS): Performed by: OTOLARYNGOLOGY

## 2023-06-15 PROCEDURE — 700101 HCHG RX REV CODE 250: Performed by: OTOLARYNGOLOGY

## 2023-06-15 PROCEDURE — 700105 HCHG RX REV CODE 258: Performed by: OTOLARYNGOLOGY

## 2023-06-15 PROCEDURE — 160041 HCHG SURGERY MINUTES - EA ADDL 1 MIN LEVEL 4: Performed by: OTOLARYNGOLOGY

## 2023-06-15 PROCEDURE — 700111 HCHG RX REV CODE 636 W/ 250 OVERRIDE (IP): Performed by: ANESTHESIOLOGY

## 2023-06-15 PROCEDURE — 160029 HCHG SURGERY MINUTES - 1ST 30 MINS LEVEL 4: Performed by: OTOLARYNGOLOGY

## 2023-06-15 PROCEDURE — 82962 GLUCOSE BLOOD TEST: CPT

## 2023-06-15 PROCEDURE — 71046 X-RAY EXAM CHEST 2 VIEWS: CPT

## 2023-06-15 PROCEDURE — 160046 HCHG PACU - 1ST 60 MINS PHASE II: Performed by: OTOLARYNGOLOGY

## 2023-06-15 PROCEDURE — 160009 HCHG ANES TIME/MIN: Performed by: OTOLARYNGOLOGY

## 2023-06-15 PROCEDURE — 00300 ANES ALL PX INTEG H/N/PTRUNK: CPT | Performed by: ANESTHESIOLOGY

## 2023-06-15 PROCEDURE — C1767 GENERATOR, NEURO NON-RECHARG: HCPCS | Performed by: OTOLARYNGOLOGY

## 2023-06-15 PROCEDURE — 160047 HCHG PACU  - EA ADDL 30 MINS PHASE II: Performed by: OTOLARYNGOLOGY

## 2023-06-15 PROCEDURE — 160048 HCHG OR STATISTICAL LEVEL 1-5: Performed by: OTOLARYNGOLOGY

## 2023-06-15 PROCEDURE — 99100 ANES PT EXTEME AGE<1 YR&>70: CPT | Performed by: ANESTHESIOLOGY

## 2023-06-15 PROCEDURE — C1778 LEAD, NEUROSTIMULATOR: HCPCS | Performed by: OTOLARYNGOLOGY

## 2023-06-15 PROCEDURE — 160002 HCHG RECOVERY MINUTES (STAT): Performed by: OTOLARYNGOLOGY

## 2023-06-15 PROCEDURE — 700101 HCHG RX REV CODE 250: Performed by: ANESTHESIOLOGY

## 2023-06-15 PROCEDURE — 160035 HCHG PACU - 1ST 60 MINS PHASE I: Performed by: OTOLARYNGOLOGY

## 2023-06-15 DEVICE — IMPLANTABLE PULSE GENERATOR INSPIRE (1EA): Type: IMPLANTABLE DEVICE | Site: CHEST | Status: FUNCTIONAL

## 2023-06-15 DEVICE — IMPLANTABLE RESPIRATORY SENSING LEAD INSPIRE (1EA): Type: IMPLANTABLE DEVICE | Site: CHEST | Status: FUNCTIONAL

## 2023-06-15 DEVICE — IMPLANTABLE STIMULATION LEAD INSPIRE (1EA): Type: IMPLANTABLE DEVICE | Site: CHEST | Status: FUNCTIONAL

## 2023-06-15 RX ORDER — ONDANSETRON 2 MG/ML
INJECTION INTRAMUSCULAR; INTRAVENOUS PRN
Status: DISCONTINUED | OUTPATIENT
Start: 2023-06-15 | End: 2023-06-15 | Stop reason: HOSPADM

## 2023-06-15 RX ORDER — EPINEPHRINE 1 MG/ML(1)
AMPUL (ML) INJECTION
Status: DISCONTINUED
Start: 2023-06-15 | End: 2023-06-15 | Stop reason: HOSPADM

## 2023-06-15 RX ORDER — DEXAMETHASONE SODIUM PHOSPHATE 4 MG/ML
INJECTION, SOLUTION INTRA-ARTICULAR; INTRALESIONAL; INTRAMUSCULAR; INTRAVENOUS; SOFT TISSUE PRN
Status: DISCONTINUED | OUTPATIENT
Start: 2023-06-15 | End: 2023-06-15 | Stop reason: SURG

## 2023-06-15 RX ORDER — LIDOCAINE HYDROCHLORIDE AND EPINEPHRINE 10; 10 MG/ML; UG/ML
INJECTION, SOLUTION INFILTRATION; PERINEURAL
Status: DISCONTINUED | OUTPATIENT
Start: 2023-06-15 | End: 2023-06-15 | Stop reason: HOSPADM

## 2023-06-15 RX ORDER — OXYCODONE HCL 5 MG/5 ML
10 SOLUTION, ORAL ORAL
Status: DISCONTINUED | OUTPATIENT
Start: 2023-06-15 | End: 2023-06-15 | Stop reason: HOSPADM

## 2023-06-15 RX ORDER — HALOPERIDOL 5 MG/ML
1 INJECTION INTRAMUSCULAR
Status: DISCONTINUED | OUTPATIENT
Start: 2023-06-15 | End: 2023-06-15 | Stop reason: HOSPADM

## 2023-06-15 RX ORDER — SODIUM CHLORIDE, SODIUM LACTATE, POTASSIUM CHLORIDE, CALCIUM CHLORIDE 600; 310; 30; 20 MG/100ML; MG/100ML; MG/100ML; MG/100ML
INJECTION, SOLUTION INTRAVENOUS CONTINUOUS
Status: DISCONTINUED | OUTPATIENT
Start: 2023-06-15 | End: 2023-06-15 | Stop reason: HOSPADM

## 2023-06-15 RX ORDER — CEFAZOLIN SODIUM 1 G/3ML
INJECTION, POWDER, FOR SOLUTION INTRAMUSCULAR; INTRAVENOUS PRN
Status: DISCONTINUED | OUTPATIENT
Start: 2023-06-15 | End: 2023-06-15 | Stop reason: SURG

## 2023-06-15 RX ORDER — LIDOCAINE HYDROCHLORIDE 10 MG/ML
INJECTION, SOLUTION EPIDURAL; INFILTRATION; INTRACAUDAL; PERINEURAL
Status: DISCONTINUED
Start: 2023-06-15 | End: 2023-06-15 | Stop reason: HOSPADM

## 2023-06-15 RX ORDER — ROCURONIUM BROMIDE 10 MG/ML
INJECTION, SOLUTION INTRAVENOUS PRN
Status: DISCONTINUED | OUTPATIENT
Start: 2023-06-15 | End: 2023-06-15 | Stop reason: SURG

## 2023-06-15 RX ORDER — LIDOCAINE HYDROCHLORIDE 20 MG/ML
INJECTION, SOLUTION EPIDURAL; INFILTRATION; INTRACAUDAL; PERINEURAL PRN
Status: DISCONTINUED | OUTPATIENT
Start: 2023-06-15 | End: 2023-06-15 | Stop reason: SURG

## 2023-06-15 RX ORDER — ONDANSETRON 2 MG/ML
4 INJECTION INTRAMUSCULAR; INTRAVENOUS
Status: DISCONTINUED | OUTPATIENT
Start: 2023-06-15 | End: 2023-06-15 | Stop reason: HOSPADM

## 2023-06-15 RX ORDER — OXYCODONE HCL 5 MG/5 ML
5 SOLUTION, ORAL ORAL
Status: DISCONTINUED | OUTPATIENT
Start: 2023-06-15 | End: 2023-06-15 | Stop reason: HOSPADM

## 2023-06-15 RX ORDER — EPHEDRINE SULFATE 50 MG/ML
INJECTION, SOLUTION INTRAVENOUS PRN
Status: DISCONTINUED | OUTPATIENT
Start: 2023-06-15 | End: 2023-06-15 | Stop reason: SURG

## 2023-06-15 RX ADMIN — ROCURONIUM BROMIDE 40 MG: 50 INJECTION, SOLUTION INTRAVENOUS at 10:39

## 2023-06-15 RX ADMIN — PROPOFOL 120 MG: 10 INJECTION, EMULSION INTRAVENOUS at 10:39

## 2023-06-15 RX ADMIN — EPHEDRINE SULFATE 10 MG: 50 INJECTION, SOLUTION INTRAVENOUS at 12:24

## 2023-06-15 RX ADMIN — SUGAMMADEX 200 MG: 100 INJECTION, SOLUTION INTRAVENOUS at 10:55

## 2023-06-15 RX ADMIN — FENTANYL CITRATE 25 MCG: 50 INJECTION, SOLUTION INTRAMUSCULAR; INTRAVENOUS at 11:44

## 2023-06-15 RX ADMIN — LIDOCAINE HYDROCHLORIDE 50 MG: 20 INJECTION, SOLUTION EPIDURAL; INFILTRATION; INTRACAUDAL at 10:39

## 2023-06-15 RX ADMIN — FENTANYL CITRATE 100 MCG: 50 INJECTION, SOLUTION INTRAMUSCULAR; INTRAVENOUS at 10:39

## 2023-06-15 RX ADMIN — FENTANYL CITRATE 25 MCG: 50 INJECTION, SOLUTION INTRAMUSCULAR; INTRAVENOUS at 12:04

## 2023-06-15 RX ADMIN — DEXAMETHASONE SODIUM PHOSPHATE 8 MG: 4 INJECTION INTRA-ARTICULAR; INTRALESIONAL; INTRAMUSCULAR; INTRAVENOUS; SOFT TISSUE at 10:49

## 2023-06-15 RX ADMIN — SODIUM CHLORIDE, POTASSIUM CHLORIDE, SODIUM LACTATE AND CALCIUM CHLORIDE: 600; 310; 30; 20 INJECTION, SOLUTION INTRAVENOUS at 10:33

## 2023-06-15 RX ADMIN — ONDANSETRON 4 MG: 2 INJECTION INTRAMUSCULAR; INTRAVENOUS at 12:35

## 2023-06-15 RX ADMIN — CEFAZOLIN 2 G: 1 INJECTION, POWDER, FOR SOLUTION INTRAMUSCULAR; INTRAVENOUS at 10:39

## 2023-06-15 ASSESSMENT — PAIN DESCRIPTION - PAIN TYPE
TYPE: SURGICAL PAIN

## 2023-06-15 ASSESSMENT — PAIN SCALES - GENERAL: PAIN_LEVEL: 0

## 2023-06-15 NOTE — ANESTHESIA PROCEDURE NOTES
Airway    Date/Time: 6/15/2023 10:40 AM    Performed by: Burton Bradford M.D.  Authorized by: Burton Bradford M.D.    Location:  OR  Urgency:  Elective  Indications for Airway Management:  Anesthesia      Spontaneous Ventilation: absent    Sedation Level:  Deep  Preoxygenated: Yes    Patient Position:  Sniffing  Mask Difficulty Assessment:  1 - vent by mask  Final Airway Type:  Endotracheal airway  Final Endotracheal Airway:  ETT  Cuffed: Yes    Technique Used for Successful ETT Placement:  Direct laryngoscopy  Devices/Methods Used in Placement:  Intubating stylet and anterior pressure/BURP    Insertion Site:  Oral  Blade Type:  Noonan  Laryngoscope Blade/Videolaryngoscope Blade Size:  2  ETT Size (mm):  6.5  Measured from:  Teeth  ETT to Teeth (cm):  22  Placement Verified by: auscultation and capnometry    Cormack-Lehane Classification:  Grade IIb - view of arytenoids or posterior of glottis only  Number of Attempts at Approach:  1

## 2023-06-15 NOTE — OR NURSING
1258 - Pt to PACU 7 from OR.  Bedside report from anesthesiologist and RN.  Attached to monitoring, VSS, breathing is calm and unlabored on 5 L oxymask with oral airway in place. Per anesthesiologist, pt does not need a recheck BS. Dressing to R neck and R chest wall CDI, dermabonded.     1310- Pt awake, oral airway dc'd, tolerating sips of water, no pain or nausea reported. Weaned to RA.     1330- Called and updated pt's , placed on 2L NC for desat, given I.S.     1420- Pt transferred in [facility] on 2L NC to radiology for CXR    1445- Pt back from Xray, VSS, no pain or nausea.      1453- Pt's  brought back to bedside.     1458- Reviewed discharge instructions with pt's . All questions answered, all instructions acknowledged. Pt using I.S., on RA.     1505- IV dc'd, pt sats stable on RA.     1520- Pt assisted to the bathroom and then escorted out to 's car via wheelchair by RN.

## 2023-06-15 NOTE — ANESTHESIA TIME REPORT
Anesthesia Start and Stop Event Times     Date Time Event    6/15/2023 1025 Ready for Procedure     1033 Anesthesia Start     1300 Anesthesia Stop        Responsible Staff  06/15/23    Name Role Begin End    Burton Bradford M.D. Anesth 1033 1300        Overtime Reason:  no overtime (within assigned shift)    Comments:

## 2023-06-15 NOTE — ANESTHESIA POSTPROCEDURE EVALUATION
Patient: Meme Shelton    Procedure Summary     Date: 06/15/23 Room / Location: [facility] ROOM 23 / SURGERY SAME DAY [facility]    Anesthesia Start: 1033 Anesthesia Stop: 1300    Procedure: OPEN IMPLATATION OF HYPOGLOSSAL NERVE NEUROSTIMULATOR ARRAY PULSE GENERATOR AND DISTAL RESPIRATORY SENSOR ELECTRODE OR ELECTRODE ARRAY. Nerve stimulation monitoring (Right: Chest) Diagnosis: (OBSTRUCTIVE SLEEP APNEABMI: 23)    Surgeons: Gorge Reynoso M.D. Responsible Provider: Burton Bradford M.D.    Anesthesia Type: general ASA Status: 2          Final Anesthesia Type: general  Last vitals  BP   Blood Pressure : 133/63    Temp   36.9 °C (98.5 °F)    Pulse   (!) 55   Resp   18    SpO2   91 %      Anesthesia Post Evaluation    Patient location during evaluation: PACU  Patient participation: complete - patient participated  Level of consciousness: awake and alert  Pain score: 0    Airway patency: patent  Anesthetic complications: no  Cardiovascular status: hemodynamically stable  Respiratory status: acceptable  Hydration status: euvolemic    PONV: none          No notable events documented.     Nurse Pain Score: 0 (NPRS)

## 2023-06-15 NOTE — OP REPORT
DATE OF SERVICE:  06/15/2023     PREOPERATIVE DIAGNOSIS:  Obstructive sleep apnea.     POSTOPERATIVE DIAGNOSIS:  Obstructive sleep apnea.     PROCEDURES PERFORMED:  Open implantation of hypoglossal nerve neurostimulator array pulse generator and distal respiratory sensor electrode: AKA Inspire implantation.     ANESTHESIA:  General endotracheal.     ANESTHESIOLOGIST:  Burton Bradford MD     INDICATIONS FOR PROCEDURE:  The patient is a pleasant 78-year-old female with   severe obstructive sleep apnea.  Her apnea-hypopnea index is 48.6 and she   spent 85% of the recorded time less than 90 degrees, oxygen saturation.  She   had a drug-induced sleep endoscopy that showed majority anteroposterior   pharyngeal collapse.  Risks, benefits and alternatives to the Inspire surgery   were discussed with the patient preoperatively and she gave her informed   consent.     OPERATIVE FINDINGS:  Uneventful placement of the Inspire implant.     PROCEDURE IN DETAIL:  The patient was identified in the preoperative holding   area and brought to the operating room.  She was intubated without difficulty   and a timeout was satisfactorily completed.  The chest was marked at the   clavicle and in the intercostal space between the 2nd and 3rd ribs.  A 5 cm   incision was drawn 3 cm lateral to the sternal border.  This was injected with   1% lidocaine with 1:100,000 epinephrine.  The lower border of the mandible   was then marked and a 5 cm incision was made starting 1 cm lateral to the   midline approximately 1 cm below the inferior border of the mandible.  This   was also injected with the same local anesthetic.  The patient was prepped and   draped in the usual sterile fashion.  The chest was incised first with a 15   blade scalpel.  Dissection proceeded down through the subcutaneous tissues to   the pectoralis major muscle.  The pec major was bluntly dissected until the   subpectoral fat pad was encountered.  Beneath the subpectoral fat  pad, the   external intercostal muscle was identified.  This was elevated gently exposing   the internal intercostal muscles just deep to them.  The sensing lead was   placed in this plane between the 2nd and 3rd rib.  This was anchored with 3-0   silk sutures.  The sensing lead was wrapped in a moist Ray-Gabo and placed into   the pocket.  Attention was turned to the neck.     A 15 blade scalpel was used to make the skin incision and carried down through   the platysma.  The digastric muscle was identified and the digastric tendon   was exposed.  A 3-0 silk sutures were placed into the digastric tendon as a   retractor and eventual anchor.  The submandibular gland was retracted   posteriorly and the mylohyoid muscle was exposed.  This muscle was retracted   anteriorly exposing the hypoglossal nerve and the Ranine veins.  The nerve was   exposed and the break point was identified.  Using the microscope, the nerve   was dissected  the functional breakpoint and its exclusion branches   from the inclusion branches of the hypoglossal nerve.  A vessel loop was   passed underneath the hypoglossal nerve and nerve stimulation was used to   confirm inclusion and exclusion branches.  The cuff electrode for the   hypoglossal nerve stimulator was placed distally to these branches innervating   the genioglossus, transverse and vertical muscles.  The stimulation lead was   anchored to the digastric tendon using the silk sutures previously placed.    The stimulation lead was then tunneled in a subplatysmal plane with blunt   dissection under direct visualization and brought out into the subclavicular   pocket where both the stimulation lead and the respiratory sensing lead were   connected to the implantable pulse generator.  The implantable pulse generator   was placed in the subclavicular pocket, ensuring the lead body was deep to   the generator and secured with two 2-0 silk sutures.  Diagnostic evaluation   confirmed  good placement of the stimulation cuff as demonstrated by activation   of the genioglossus and transverse and vertical muscles, resulting in   unhindered stiffened tongue protrusion, which was confirmed visually.    Diagnostic evaluation also confirmed good respiratory sensor placement as   demonstrated by a sensing waveform with good rise and fall associated with   patient respirations.  The wounds were then closed in multiple layers.  A 3-0   Vicryl sutures were placed in the deep tissues and reapproximating the   platysma muscle.  A running subcuticular 4-0 Monocryl was placed in the skin   and then a Dermabond dressing was applied.  The patient was awoken and brought   to the postanesthesia care unit in good condition.     ESTIMATED BLOOD LOSS:  10 mL.     COMPLICATIONS:  None.     SPECIMENS REMOVED:  None.     IMPLANTS:  1.  The neurostimulator patient .  2.  Implantable neurostimulator generator.  3.  Implantable neurostimulator leads: a stimulation lead and a   breathing sensor lead.        ______________________________  Gorge SMYTH/IVAN/ERENDIRA    DD:  06/15/2023 13:06  DT:  06/15/2023 14:23    Job#:  [id number]

## 2023-06-15 NOTE — ANESTHESIA PREPROCEDURE EVALUATION
Case: [id number] Date/Time: 06/15/23 1000    Procedure: OPEN IMPLATATION OF HYPOGLOSSAL NERVE NEUROSTIMULATOR ARRAY PULSE GENERATOR AND DISTAL RESPIRATORY SENSOR ELECTRODE OR ELECTRODE ARRAY.    Pre-op diagnosis:       OBSTRUCTIVE SLEEP APNEA      BMI: 23    Location: [facility] ROOM 23 / SURGERY SAME DAY [facility]    Surgeons: Gorge Reynoso M.D.          Relevant Problems   ANESTHESIA   (positive) RAD (obstructive sleep apnea)       Physical Exam    Airway   Mallampati: III  TM distance: >3 FB  Neck ROM: full       Cardiovascular - normal exam  Rhythm: regular  Rate: normal  (-) murmur     Dental - normal exam           Pulmonary - normal exam  Breath sounds clear to auscultation     Abdominal    Neurological - normal exam               Anesthesia Plan    ASA 2       Plan - general       Airway plan will be ETT          Induction: intravenous    Postoperative Plan: Postoperative administration of opioids is intended.    Pertinent diagnostic labs and testing reviewed    Informed Consent:    Anesthetic plan and risks discussed with patient.

## 2023-06-15 NOTE — DISCHARGE INSTRUCTIONS
HOME CARE INSTRUCTIONS    ACTIVITY: Rest and take it easy for the first 24 hours.  A responsible adult is recommended to remain with you during that time.  It is normal to feel sleepy.  We encourage you to not do anything that requires balance, judgment or coordination.    FOR 24 HOURS DO NOT:  Drive, operate machinery or run household appliances.  Drink beer or alcoholic beverages.  Make important decisions or sign legal documents.    SPECIAL INSTRUCTIONS: ok to shower tomorrow but no baths or swimming until follow up.     DIET: To avoid nausea, slowly advance diet as tolerated, avoiding spicy or greasy foods for the first day.  Add more substantial food to your diet according to your physician's instructions.  Babies can be fed formula or breast milk as soon as they are hungry.  INCREASE FLUIDS AND FIBER TO AVOID CONSTIPATION.      MEDICATIONS: Resume taking daily medication.  Take prescribed pain medication with food.  If no medication is prescribed, you may take non-aspirin pain medication if needed.  PAIN MEDICATION CAN BE VERY CONSTIPATING.  Take a stool softener or laxative such as senokot, pericolace, or milk of magnesia if needed.    A follow-up appointment should be arranged with your doctor; call to schedule.    You should CALL YOUR PHYSICIAN if you develop:  Fever greater than 101 degrees F.  Pain not relieved by medication, or persistent nausea or vomiting.  Excessive bleeding (blood soaking through dressing) or unexpected drainage from the wound.  Extreme redness or swelling around the incision site, drainage of pus or foul smelling drainage.  Inability to urinate or empty your bladder within 8 hours.  Problems with breathing or chest pain.    You should call 911 if you develop problems with breathing or chest pain.  If you are unable to contact your doctor or surgical center, you should go to the nearest emergency room or urgent care center.  Physician's telephone #: Dr. Reynoso [phone]    MILD  FLU-LIKE SYMPTOMS ARE NORMAL.  YOU MAY EXPERIENCE GENERALIZED MUSCLE ACHES, THROAT IRRITATION, HEADACHE AND/OR SOME NAUSEA.    If any questions arise, call your doctor.  If your doctor is not available, please feel free to call the Surgical Center at [phone].  The Center is open Monday through Friday from 7AM to 7PM.      A registered nurse may call you a few days after your surgery to see how you are doing after your procedure.    You may also receive a survey in the mail within the next two weeks and we ask that you take a few moments to complete the survey and return it to us.  Our goal is to provide you with very good care and we value your comments.     Depression / Suicide Risk    As you are discharged from this [facility] facility, it is important to learn how to keep safe from harming yourself.    Recognize the warning signs:  Abrupt changes in personality, positive or negative- including increase in energy   Giving away possessions  Change in eating patterns- significant weight changes-  positive or negative  Change in sleeping patterns- unable to sleep or sleeping all the time   Unwillingness or inability to communicate  Depression  Unusual sadness, discouragement and loneliness  Talk of wanting to die  Neglect of personal appearance   Rebelliousness- reckless behavior  Withdrawal from people/activities they love  Confusion- inability to concentrate     If you or a loved one observes any of these behaviors or has concerns about self-harm, here's what you can do:  Talk about it- your feelings and reasons for harming yourself  Remove any means that you might use to hurt yourself (examples: pills, rope, extension cords, firearm)  Get professional help from the community (Mental Health, Substance Abuse, psychological counseling)  Do not be alone:Call your Safe Contact- someone whom you trust who will be there for you.  Call your local CRISIS HOTLINE 405-1140 or [phone]  Call your local  Children's Mobile Crisis Response Team [place] (371) 588-3345 or [email or web address].P[employer]y  Call the toll free National Suicide Prevention Hotlines   National Suicide Prevention Lifeline 194-284-EFGQ (3186)  [place] walk[employer] Line Network 800-SUICIDE (468-5438)    I acknowledge receipt and understanding of these Home Care instructions.

## 2023-06-15 NOTE — OR SURGEON
Immediate Post OP Note    PreOp Diagnosis: RAD      PostOp Diagnosis: RAD      Procedure(s):  OPEN IMPLATATION OF HYPOGLOSSAL NERVE NEUROSTIMULATOR ARRAY PULSE GENERATOR AND DISTAL RESPIRATORY SENSOR ELECTRODE OR ELECTRODE ARRAY. Nerve stimulation monitoring - Wound Class: Clean    Surgeon(s):  Gorge Reynoso M.D.    Anesthesiologist/Type of Anesthesia:  Anesthesiologist: Burton Bradford M.D./General    Surgical Staff:  Circulator: Bruna Ash R.N.  Relief Circulator: Ese Galeas R.N.  Relief Scrub: Sapna Mclaughlin C.N.A.  Scrub Person: Petra Weinberg; Phillip Floyd    Specimens removed if any:  * No specimens in log *    Estimated Blood Loss: 10cc    Findings: Uneventful placement    Complications: None    #[id number]    6/15/2023 12:56 PM Gorge Reynoso M.D.

## 2023-07-20 ENCOUNTER — OFFICE VISIT (OUTPATIENT)
Dept: SLEEP MEDICINE | Facility: MEDICAL CENTER | Age: 78
End: 2023-07-20
Attending: PREVENTIVE MEDICINE
Payer: MEDICARE

## 2023-07-20 VITALS
WEIGHT: 155 LBS | DIASTOLIC BLOOD PRESSURE: 60 MMHG | OXYGEN SATURATION: 95 % | RESPIRATION RATE: 16 BRPM | HEART RATE: 65 BPM | HEIGHT: 69 IN | BODY MASS INDEX: 22.96 KG/M2 | SYSTOLIC BLOOD PRESSURE: 92 MMHG

## 2023-07-20 DIAGNOSIS — Z96.82 S/P INSERTION OF HYPOGLOSSAL NERVE STIMULATOR: ICD-10-CM

## 2023-07-20 DIAGNOSIS — Z45.42 ENCOUNTER FOR ADJUSTMENT AND MANAGEMENT OF NEUROSTIMULATOR: ICD-10-CM

## 2023-07-20 DIAGNOSIS — G47.33 OSA (OBSTRUCTIVE SLEEP APNEA): Primary | ICD-10-CM

## 2023-07-20 DIAGNOSIS — G47.34 NOCTURNAL HYPOXIA: ICD-10-CM

## 2023-07-20 PROCEDURE — 3074F SYST BP LT 130 MM HG: CPT | Performed by: STUDENT IN AN ORGANIZED HEALTH CARE EDUCATION/TRAINING PROGRAM

## 2023-07-20 PROCEDURE — 95977 ALYS CPLX CN NPGT PRGRMG: CPT | Performed by: STUDENT IN AN ORGANIZED HEALTH CARE EDUCATION/TRAINING PROGRAM

## 2023-07-20 PROCEDURE — 99212 OFFICE O/P EST SF 10 MIN: CPT | Mod: 25 | Performed by: STUDENT IN AN ORGANIZED HEALTH CARE EDUCATION/TRAINING PROGRAM

## 2023-07-20 PROCEDURE — 3078F DIAST BP <80 MM HG: CPT | Performed by: STUDENT IN AN ORGANIZED HEALTH CARE EDUCATION/TRAINING PROGRAM

## 2023-07-20 PROCEDURE — 99213 OFFICE O/P EST LOW 20 MIN: CPT | Mod: 25 | Performed by: STUDENT IN AN ORGANIZED HEALTH CARE EDUCATION/TRAINING PROGRAM

## 2023-07-20 NOTE — PROGRESS NOTES
[facility] Follow Up Note     Date: 7/20/2023 / Time: 11:13 AM    Patient ID:   Name:             Meme Shelton   YOB: 1945  Age:                 78 y.o.  female   MRN:               [id number]    CC: Management of obstructive sleep apnea and activation of hypoglossal nerve stimulator    HISTORY OF PRESENT ILLNESS:       Meme Shelton is a 78 y.o. female with type 2 diabetes mellitus, hypertension, GERD, memory impairment, obstructive sleep apnea status post hypoglossal nerve stimulator and nocturnal hypoxia on supplemental oxygen.  Presents to Sleep Clinic for evaluation of obstructive sleep apnea and activation of hypoglossal nerve stimulator.    She is accompanied by her  at today's visit.    She states she had her hypoglossal nerve stimulator implanted by Dr. Reynoso on 6/15/2023.  Since that time she has had no difficulty with speech or swallowing.  She has continued to use her supplemental oxygen 2 L/min at night.  She has noticed a slight decrease in her lower lip movement on the right side.  She notices it most with smiling.    She was unable to tolerate CPAP in the past due to claustrophobia from the device.    Sleep Schedule  Bedtime: Weekday & Weekend 10pm  Wake time: Weekday & Weekend 9am  Sleep-onset latency: 15 min   Awakenings from sleep: 1-2  Difficulty falling back asleep: No  Bedroom partner: Yes  Naps: Yes couple of hours      Programming and interrogation of hypoglossal nerve stimulator  Device was interrogated, thresholds were obtained consisting of:    Electrode pulse configuration (+-+)    Sensation Threshold: 0.5 v, Functional Threshold: 0.9v:     Waveform to asses respiratory sense lead integrity was run and saved to  report: normal    Programmed settings   Activation settings:  1)Start Delay: 30 min  2)Pause Time: 15 min  3)Total run time: 9 hours  4) Minimum voltage 0.8 v   5) Maximum voltage 1.7v  6) Pulse configuration (+-+)  7)  Current voltage 0.8 v      SLEEP HISTORY   12/21/2022 PSG split-night study showed severe obstructive sleep apnea with an overall AHI of 48.6, minimum oxygen saturation 81%.  Patient was placed on CPAP which did show improvement in CPAP pressure of 13 cm water.        REVIEW OF SYSTEMS  Constitutional: Denies fevers, Denies weight changes  Ears/Nose/Throat/Mouth: Denies nasal congestion or sore throat, difficulty swallowing, speech changes, facial numbness  Cardiovascular: Denies chest pain or palpitations   Respiratory: Denies shortness of breath, Denies cough  Gastrointestinal/Hepatic: Denies abdominal pain, nausea, vomiting, diarrhea  Musculoskeletal/Rheum: Denies  joint pain and swelling   Sleep: See HPI    PMH:  has a past medical history of Arthritis (05/02/2023), Awareness under anesthesia, Cataract, Dependence on nocturnal oxygen therapy (05/02/2023), Diabetes ([facility]), High cholesterol (03/20/2023), Hypertension (03/20/2023), Non-Hodgkin lymphoma in remission (HCC), Psychiatric problem (03/20/2023), Short-term memory loss (05/02/2023), Sleep apnea (05/02/2023), and Urinary incontinence (03/20/2023).  MEDS:   Current Outpatient Medications:     DODEX 1000 MCG/ML Solution, INJECT 1 ML SUBCUTANEOUSLY EVERY 14 DAYS, Disp: , Rfl:     TOUJEO MAX SOLOSTAR 300 UNIT/ML Solution Pen-injector, 22 Units every morning., Disp: , Rfl:     mupirocin (BACTROBAN) 2 % Ointment, APPLY TOPICALLY IN EACH NOSTRIL TWICE DAILY AS NEEDED, Disp: , Rfl:     FLUoxetine (PROZAC) 20 MG Cap, Take 20 mg by mouth every day., Disp: , Rfl:     melatonin 1 mg Tab, Take 0.5 mg by mouth every evening., Disp: , Rfl:     benazepril (LOTENSIN) 40 MG tablet, Take 40 mg by mouth every morning., Disp: , Rfl:     Empagliflozin-metFORMIN HCl ER (SYNJARDY XR) 12.5-1000 MG TABLET SR 24 HR, every day., Disp: , Rfl:     pravastatin (PRAVACHOL) 20 MG Tab, Take 20 mg by mouth every day., Disp: , Rfl:     indapamide (LOZOL) 2.5 MG Tab, Take 2.5 mg by mouth every  "morning., Disp: , Rfl:     dilTIAZem HCl ER Beads (TAZTIA XT PO), Take 240 mg by mouth every day at 6 PM., Disp: , Rfl:     Cholecalciferol (D3 5000 PO), Take 5,000 Int'l Units by mouth every day., Disp: , Rfl:     SITagliptin Phosphate (JANUVIA PO), Take 100 mg by mouth every morning., Disp: , Rfl:     omeprazole (PRILOSEC) 20 MG delayed-release capsule, Take 20 mg by mouth every day., Disp: , Rfl:   ALLERGIES: No Known Allergies  SURGHX:   Past Surgical History:   Procedure Laterality Date    VT OPEN IMPLTJ HPGLSL NRV NSTIM RA PG&RESPIR SENSOR Right 6/15/2023    Procedure: OPEN IMPLATATION OF HYPOGLOSSAL NERVE NEUROSTIMULATOR ARRAY PULSE GENERATOR AND DISTAL RESPIRATORY SENSOR ELECTRODE OR ELECTRODE ARRAY. Nerve stimulation monitoring;  Surgeon: Gorge Reynoso M.D.;  Location: SURGERY SAME DAY [facility];  Service: Ent    VT DISE DYN EVAL SLEEP DISORDERED BREATHING FLX DX  04/04/2023    Procedure: DRUG INDUCED SLEEP ENDOSCOPY;  Surgeon: Gorge Reynoso M.D.;  Location: SURGERY SAME DAY [facility];  Service: Ent    CATARACT EXTRACTION WITH IOL Bilateral     CHOLECYSTECTOMY      HYSTERECTOMY LAPAROSCOPY       SOCHX:  reports that she quit smoking about 41 years ago. Her smoking use included cigarettes. She has a 13.00 pack-year smoking history. She has been exposed to tobacco smoke. She has never used smokeless tobacco. She reports current alcohol use of about 0.6 oz of alcohol per week. She reports that she does not use drugs..  FH:   Family History   Problem Relation Age of Onset    Cancer Mother     Cancer Father          Physical Exam:  Vitals/ General Appearance:   Weight/BMI: Body mass index is 22.89 kg/m².  BP 92/60 (BP Location: Left arm, Patient Position: Sitting, BP Cuff Size: Adult)   Pulse 65   Resp 16   Ht 1.753 m (5' 9\")   Wt 70.3 kg (155 lb)   SpO2 95%   Vitals:    07/20/23 1117   BP: 92/60   BP Location: Left arm   Patient Position: Sitting   BP Cuff Size: Adult   Pulse: 65   Resp: 16 " "  SpO2: 95%   Weight: 70.3 kg (155 lb)   Height: 1.753 m (5' 9\")       Pt. is alert and oriented to time, place and person. Cooperative and in no apparent distress.       Constitutional: Alert, no distress, well-groomed.  Skin: Surgical incision sites are C/D/I on chest and neck. No signs of hematoma, infection or drainage.  Eye: Round. Conjunctiva clear, lids normal. No icterus.   ENMT: Lips pink without lesions, good dentition, moist mucous membranes. Phonation normal. With smile lower margin of lip near mid line limited movement   Tongue: movement bilaterally intact, no fasciculations, no atrophy, Up and down movement intact.    Neck: No masses, no thyromegaly. Moves freely without pain.  CV: Pulse as reported by patient  Respiratory: Unlabored respiratory effort, no cough or audible wheeze  Psych: Alert and oriented x3, normal affect and mood.     ASSESSMENT AND PLAN  Meme Shelton is a 78 y.o. female with type 2 diabetes mellitus, hypertension, memory impairment, obstructive sleep apnea status post hypoglossal nerve stimulator and nocturnal hypoxia on supplemental oxygen.  Presents to Sleep Clinic for evaluation of obstructive sleep apnea and activation of hypoglossal nerve stimulator.     1.  Obstructive sleep apnea   She  was previously on CPAP however due to intolerance She had hypoglossal nerve stimulator implanted on 6/15/2023.     The pathophysiology of sleep anea and the increased risk of cardiovascular morbidity from untreated sleep apnea is discussed in detail with the patient. She  also has HTN, type 2 diabetes mellitus, memory impairment, GERD, which can be worsened by her RAD.     She is urged to avoid supine sleep, weight gain and alcoholic beverages since all of these can worsen sleep apnea. She is cautioned against drowsy driving. If She feels sleepy while driving, She must pull over for a break/nap, rather than persist on the road, in the interest of She own safety and that of others on the " road.    Plan  - Hypoglossal nerve stimulator was interrogated.  Programming was done and initial settings are (+-+) Sensation: 0.5 v, Functional Threshold: 0.9 v: .Patient control: 0.8 v - 1.7 v  -Advised to continue supplemental oxygen 2 L/min at night  - F/u in 4-8 weeks to assess the efficiacy of the treatment    - The patient will advance stimulation setting by 1 setting every 4 to 7 days as tolerable.  -She will reach out to us if any questions or concerns arise before then. Teaching performed with: batteries, remote use, how to titrate voltage and to providers if patient requires dental work or pacemaker in the future. Additional teaching included: MRI/CT/ultrasound/airport security screening precautions and compatibility. Handout was provided to the patient.         2. Regarding treatment of other past medical problems and general health maintenance,  She is urged to follow up with PCP.      Please note portions of this record was created using voice recognition software. I have made every reasonable attempt to correct obvious errors, but I expect that there are errors of grammar and possibly content I did not discover before finalizing the note.

## 2023-09-21 ENCOUNTER — OFFICE VISIT (OUTPATIENT)
Dept: SLEEP MEDICINE | Facility: MEDICAL CENTER | Age: 78
End: 2023-09-21
Attending: STUDENT IN AN ORGANIZED HEALTH CARE EDUCATION/TRAINING PROGRAM
Payer: MEDICARE

## 2023-09-21 VITALS
HEART RATE: 65 BPM | WEIGHT: 160 LBS | DIASTOLIC BLOOD PRESSURE: 60 MMHG | HEIGHT: 69 IN | BODY MASS INDEX: 23.7 KG/M2 | OXYGEN SATURATION: 93 % | RESPIRATION RATE: 16 BRPM | SYSTOLIC BLOOD PRESSURE: 106 MMHG

## 2023-09-21 DIAGNOSIS — G47.34 NOCTURNAL HYPOXIA: ICD-10-CM

## 2023-09-21 DIAGNOSIS — G47.33 OSA (OBSTRUCTIVE SLEEP APNEA): ICD-10-CM

## 2023-09-21 DIAGNOSIS — Z45.42 ENCOUNTER FOR ADJUSTMENT AND MANAGEMENT OF NEUROSTIMULATOR: ICD-10-CM

## 2023-09-21 DIAGNOSIS — Z96.82 S/P INSERTION OF HYPOGLOSSAL NERVE STIMULATOR: ICD-10-CM

## 2023-09-21 PROCEDURE — 95976 ALYS SMPL CN NPGT PRGRMG: CPT | Performed by: STUDENT IN AN ORGANIZED HEALTH CARE EDUCATION/TRAINING PROGRAM

## 2023-09-21 PROCEDURE — 99212 OFFICE O/P EST SF 10 MIN: CPT | Performed by: STUDENT IN AN ORGANIZED HEALTH CARE EDUCATION/TRAINING PROGRAM

## 2023-09-21 PROCEDURE — 99213 OFFICE O/P EST LOW 20 MIN: CPT | Mod: 25 | Performed by: STUDENT IN AN ORGANIZED HEALTH CARE EDUCATION/TRAINING PROGRAM

## 2023-09-21 PROCEDURE — 3078F DIAST BP <80 MM HG: CPT | Performed by: STUDENT IN AN ORGANIZED HEALTH CARE EDUCATION/TRAINING PROGRAM

## 2023-09-21 PROCEDURE — 3074F SYST BP LT 130 MM HG: CPT | Performed by: STUDENT IN AN ORGANIZED HEALTH CARE EDUCATION/TRAINING PROGRAM

## 2023-09-21 ASSESSMENT — PATIENT HEALTH QUESTIONNAIRE - PHQ9
SUM OF ALL RESPONSES TO PHQ QUESTIONS 1-9: 6
5. POOR APPETITE OR OVEREATING: 0 - NOT AT ALL
CLINICAL INTERPRETATION OF PHQ2 SCORE: 2

## 2023-09-21 NOTE — PROGRESS NOTES
[facility] Follow-up Visit    CC: Follow-up regarding management of obstructive sleep apnea      HPI:  Meme Shelton is a 78 y.o.female  with type 2 diabetes mellitus, hypertension, GERD, memory impairment, and obstructive sleep apnea status post hypoglossal nerve stimulator.  Presents today to follow-up regarding management of obstructive sleep apnea.    She is accompanied by her  at today's visit.    She continues to use her hypoglossal nerve stimulator nightly.  Her  reports that he has been increasing her voltage setting every Friday.  He helps her activate the device at night at times.  She reports that overall she finds it comfortable.  She will wake up some nights and feel it stimulating but she does not pause it.  She can find it uncomfortable when she wakes up with it on.  She was unfamiliar with the pause button.  Her  reports that he has noticed her cognition has improved some more than what it was before.  He feels that her memory is slightly improved on days when she uses her inspire device now than what it was like prior to the device.    Programming and interrogation of hypoglossal nerve stimulator  Stimulation was delivered to incoming voltage.  Tongue protrusion was noted over the left margin of teeth.  Protrusion was midline.  Patient found to be comfortable.    Incoming settings  1)Start Delay: 30 min  2)Pause Time: 15 min  3)Total run time: 9 hours  4) Minimum voltage 0.8 v   5) Maximum voltage 1.7 v  6) Pulse configuration (+-+)  7) Pulse Width 90 microseconds  8) Rate 33 Hz  9) Current voltage 1.4v    Outgoing (changes in bold)  1)Start Delay: 30 min  2)Pause Time: 15 min  3)Total run time: 9 hours  4) Minimum voltage 1.1 v   5) Maximum voltage 1.8v  6) Pulse configuration (+-+)  7) Pulse Width 90 microseconds  8) Rate 33 Hz  9) Current voltage 1.4v    SLEEP HISTORY   12/21/2022 PSG split-night study showed severe obstructive sleep apnea with an overall AHI of  48.6, minimum oxygen saturation 81%.  Patient was placed on CPAP which did show improvement in CPAP pressure of 13 cm water.    Patient Active Problem List    Diagnosis Date Noted    RAD (obstructive sleep apnea) 01/18/2023    Nocturnal hypoxia 01/18/2023       Past Medical History:   Diagnosis Date    Arthritis 05/02/2023    Hands    Awareness under anesthesia     Cataract     IOL OU    Dependence on nocturnal oxygen therapy 05/02/2023    2L    Diabetes (HCC)     High cholesterol 03/20/2023    on medication    Hypertension 03/20/2023    on medication    Non-Hodgkin lymphoma in remission (HCC)     Psychiatric problem 03/20/2023    short term memory loss, slight depression    Short-term memory loss 05/02/2023    Sleep apnea 05/02/2023    2L HS    Urinary incontinence 03/20/2023    stress incontinence        Past Surgical History:   Procedure Laterality Date    PA OPEN IMPLTJ HPGLSL NRV NSTIM RA PG&RESPIR SENSOR Right 6/15/2023    Procedure: OPEN IMPLATATION OF HYPOGLOSSAL NERVE NEUROSTIMULATOR ARRAY PULSE GENERATOR AND DISTAL RESPIRATORY SENSOR ELECTRODE OR ELECTRODE ARRAY. Nerve stimulation monitoring;  Surgeon: Gorge Reynoso M.D.;  Location: SURGERY SAME DAY [facility];  Service: Ent    PA DISE DYN EVAL SLEEP DISORDERED BREATHING FLX DX  04/04/2023    Procedure: DRUG INDUCED SLEEP ENDOSCOPY;  Surgeon: Gorge Reynoso M.D.;  Location: SURGERY SAME DAY [facility];  Service: Ent    CATARACT EXTRACTION WITH IOL Bilateral     CHOLECYSTECTOMY      HYSTERECTOMY LAPAROSCOPY         Family History   Problem Relation Age of Onset    Cancer Mother     Cancer Father        Social History     Socioeconomic History    Marital status:      Spouse name: Not on file    Number of children: Not on file    Years of education: Not on file    Highest education level: Not on file   Occupational History    Not on file   Tobacco Use    Smoking status: Former     Current packs/day: 0.00     Average packs/day: 1 pack/day for  13.0 years (13.0 ttl pk-yrs)     Types: Cigarettes     Start date:      Quit date:      Years since quittin.7     Passive exposure: Past    Smokeless tobacco: Never    Tobacco comments:     Quit smoking 40 years ago   Vaping Use    Vaping Use: Never used   Substance and Sexual Activity    Alcohol use: Yes     Alcohol/week: 0.6 oz     Types: 1 Glasses of wine per week    Drug use: Never    Sexual activity: Not on file   Other Topics Concern    Not on file   Social History Narrative    Not on file     Social Determinants of Health     Financial Resource Strain: Not on file   Food Insecurity: Not on file   Transportation Needs: Not on file   Physical Activity: Not on file   Stress: Not on file   Social Connections: Not on file   Intimate Partner Violence: Not on file   Housing Stability: Not on file       Current Outpatient Medications   Medication Sig Dispense Refill    DODEX 1000 MCG/ML Solution INJECT 1 ML SUBCUTANEOUSLY EVERY 14 DAYS      TOUJEO MAX SOLOSTAR 300 UNIT/ML Solution Pen-injector 22 Units every morning.      mupirocin (BACTROBAN) 2 % Ointment APPLY TOPICALLY IN EACH NOSTRIL TWICE DAILY AS NEEDED      FLUoxetine (PROZAC) 20 MG Cap Take 20 mg by mouth every day.      melatonin 1 mg Tab Take 0.5 mg by mouth every evening.      benazepril (LOTENSIN) 40 MG tablet Take 40 mg by mouth every morning.      Empagliflozin-metFORMIN HCl ER (SYNJARDY XR) 12.5-1000 MG TABLET SR 24 HR every day.      pravastatin (PRAVACHOL) 20 MG Tab Take 20 mg by mouth every day.      indapamide (LOZOL) 2.5 MG Tab Take 2.5 mg by mouth every morning.      dilTIAZem HCl ER Beads (TAZTIA XT PO) Take 240 mg by mouth every day at 6 PM.      Cholecalciferol (D3 5000 PO) Take 5,000 Int'l Units by mouth every day.      SITagliptin Phosphate (JANUVIA PO) Take 100 mg by mouth every morning.      omeprazole (PRILOSEC) 20 MG delayed-release capsule Take 20 mg by mouth every day.       No current facility-administered medications for  "this visit.        ALLERGIES: Patient has no known allergies.    ROS  Constitutional: Denies fevers, Denies weight changes  Ears/Nose/Throat/Mouth: Denies nasal congestion or sore throat   Cardiovascular: Denies chest pain  Respiratory: Denies shortness of breath, Denies cough  Gastrointestinal/Hepatic: Denies nausea, vomiting  Sleep: see HPI      PHYSICAL EXAM  /60 (BP Location: Left arm, Patient Position: Sitting, BP Cuff Size: Large adult)   Pulse 65   Resp 16   Ht 1.753 m (5' 9\")   Wt 72.6 kg (160 lb)   SpO2 93%   BMI 23.63 kg/m²   Appearance: Well-nourished, well-developed, no acute distress  Eyes:  No scleral icterus , EOMI  Musculoskeletal:  Grossly normal; gait and station normal; digits and nails normal  Skin:  No rashes, petechiae, cyanosis  Neurologic: without focal signs; oriented to person, time, place, and purpose; judgement intact      Medical Decision Making   Assessment and Plan  Meme Shelton is a 78 y.o.female  with type 2 diabetes mellitus, hypertension, GERD, memory impairment, and obstructive sleep apnea status post hypoglossal nerve stimulator.  Presents today to follow-up regarding management of obstructive sleep apnea.    The medical record was reviewed.    Obstructive sleep apnea  She continues to use her hypoglossal nerve stimulator nightly.  Overall finds stimulation comfortable.  Discussed pause button with patient and .  She finds the delay is comfortable.   is reporting that there is some cognitive benefit since using the device versus not using the device.  Advised to slowly increase her voltage every 2 weeks now and see how her sleep changes.  Suspect she is getting close to therapeutic levels.    PLAN:   -Simple programming done at today's visit  -Advised to continue hypoglossal nerve stimulator.  -Advised to reach out via [employer] with questions     Patients with RAD are at increased risk of cardiovascular disease including coronary artery disease, " systemic arterial hypertension, pulmonary arterial hypertension, cardiac arrythmias, and stroke. The patient was advised to avoid driving a motor vehicle when drowsy.    Have advised the patient to follow up with the appropriate healthcare practitioners for all other medical problems and issues.    Return in about 2 months (around 11/21/2023).      Please note portions of this record was created using voice recognition software. I have made every reasonable attempt to correct obvious errors, but I expect that there are errors of grammar and possibly content I did not discover before finalizing the note.

## 2023-11-16 ENCOUNTER — OFFICE VISIT (OUTPATIENT)
Dept: SLEEP MEDICINE | Facility: MEDICAL CENTER | Age: 78
End: 2023-11-16
Attending: STUDENT IN AN ORGANIZED HEALTH CARE EDUCATION/TRAINING PROGRAM
Payer: MEDICARE

## 2023-11-16 VITALS
RESPIRATION RATE: 16 BRPM | OXYGEN SATURATION: 93 % | DIASTOLIC BLOOD PRESSURE: 72 MMHG | WEIGHT: 158.9 LBS | BODY MASS INDEX: 24.94 KG/M2 | HEIGHT: 67 IN | HEART RATE: 70 BPM | SYSTOLIC BLOOD PRESSURE: 122 MMHG

## 2023-11-16 DIAGNOSIS — G47.34 NOCTURNAL HYPOXIA: ICD-10-CM

## 2023-11-16 DIAGNOSIS — G47.33 OSA (OBSTRUCTIVE SLEEP APNEA): Primary | ICD-10-CM

## 2023-11-16 DIAGNOSIS — Z96.82 S/P INSERTION OF HYPOGLOSSAL NERVE STIMULATOR: ICD-10-CM

## 2023-11-16 PROCEDURE — 99213 OFFICE O/P EST LOW 20 MIN: CPT | Performed by: STUDENT IN AN ORGANIZED HEALTH CARE EDUCATION/TRAINING PROGRAM

## 2023-11-16 PROCEDURE — 99212 OFFICE O/P EST SF 10 MIN: CPT | Performed by: STUDENT IN AN ORGANIZED HEALTH CARE EDUCATION/TRAINING PROGRAM

## 2023-11-16 PROCEDURE — 3074F SYST BP LT 130 MM HG: CPT | Performed by: STUDENT IN AN ORGANIZED HEALTH CARE EDUCATION/TRAINING PROGRAM

## 2023-11-16 PROCEDURE — 95970 ALYS NPGT W/O PRGRMG: CPT | Performed by: STUDENT IN AN ORGANIZED HEALTH CARE EDUCATION/TRAINING PROGRAM

## 2023-11-16 PROCEDURE — 3078F DIAST BP <80 MM HG: CPT | Performed by: STUDENT IN AN ORGANIZED HEALTH CARE EDUCATION/TRAINING PROGRAM

## 2023-11-16 NOTE — PROGRESS NOTES
[facility] Follow-up Visit    CC: Follow-up regarding management of obstructive sleep apnea      HPI:  Meme Shelton is a 78 y.o.female  with type 2 diabetes mellitus, hypertension, GERD, memory impairment, and obstructive sleep apnea status post hypoglossal nerve stimulator.  Presents today to follow-up regarding management of obstructive sleep apnea.    She is accompanied by her  at today's visit.    She continues to use her hypoglossal nerve stimulator nightly.  With using the device her  has reported that she has more nights that she seems to feel more rested and her memory seems to not be declining.  She has her good days and bad days but overall he is happy with the progress she has made with using the device.  They did try to increase the device stimulation level but she found it uncomfortable especially with the first stimulation at activating the device.  She is happy with her current settings.    She is using supplemental oxygen 2 L/min each night.    Interrogation of hypoglossal nerve stimulator  Stimulation was delivered to incoming voltages which elicited tongue protrusion.  Patient reported tongue protrusion was comfortable.    Incoming settings  1)Start Delay: 30 min  2)Pause Time: 15 min  3)Total run time: 9 hours  4) Minimum voltage 1.1 v   5) Maximum voltage 1.8v  6) Pulse configuration (+-+)  7) Pulse Width 90 microseconds  8) Rate 33 Hz  9) Current voltage 1.5v      Patient Active Problem List    Diagnosis Date Noted    RAD (obstructive sleep apnea) 01/18/2023    Nocturnal hypoxia 01/18/2023       Past Medical History:   Diagnosis Date    Arthritis 05/02/2023    Hands    Awareness under anesthesia     Cataract     IOL OU    Dependence on nocturnal oxygen therapy 05/02/2023    2L    Diabetes (HCC)     High cholesterol 03/20/2023    on medication    Hypertension 03/20/2023    on medication    Non-Hodgkin lymphoma in remission (HCC)     Psychiatric problem 03/20/2023     short term memory loss, slight depression    Short-term memory loss 2023    Sleep apnea 2023    2L HS    Urinary incontinence 2023    stress incontinence        Past Surgical History:   Procedure Laterality Date    [place] OPEN IMPLTJ HPGLSL NRV NSTIM RA PG&RESPIR SENSOR Right 6/15/2023    Procedure: OPEN IMPLATATION OF HYPOGLOSSAL NERVE NEUROSTIMULATOR ARRAY PULSE GENERATOR AND DISTAL RESPIRATORY SENSOR ELECTRODE OR ELECTRODE ARRAY. Nerve stimulation monitoring;  Surgeon: Gorge Reynoso M.D.;  Location: SURGERY SAME DAY [facility];  Service: Ent    KS DISE DYN EVAL SLEEP DISORDERED BREATHING FLX DX  2023    Procedure: DRUG INDUCED SLEEP ENDOSCOPY;  Surgeon: Gorge Reynoso M.D.;  Location: SURGERY SAME DAY [facility];  Service: Ent    CATARACT EXTRACTION WITH IOL Bilateral     CHOLECYSTECTOMY      HYSTERECTOMY LAPAROSCOPY         Family History   Problem Relation Age of Onset    Cancer Mother     Cancer Father        Social History     Socioeconomic History    Marital status:      Spouse name: Not on file    Number of children: Not on file    Years of education: Not on file    Highest education level: Not on file   Occupational History    Not on file   Tobacco Use    Smoking status: Former     Current packs/day: 0.00     Average packs/day: 1 pack/day for 13.0 years (13.0 ttl pk-yrs)     Types: Cigarettes     Start date:      Quit date:      Years since quittin.9     Passive exposure: Past    Smokeless tobacco: Never    Tobacco comments:     Quit smoking 40 years ago   Vaping Use    Vaping Use: Never used   Substance and Sexual Activity    Alcohol use: Yes     Alcohol/week: 0.6 oz     Types: 1 Glasses of wine per week    Drug use: Never    Sexual activity: Not on file   Other Topics Concern    Not on file   Social History Narrative    Not on file     Social Determinants of Health     Financial Resource Strain: Not on file   Food Insecurity: Not on file   Transportation  "Needs: Not on file   Physical Activity: Not on file   Stress: Not on file   Social Connections: Not on file   Intimate Partner Violence: Not on file   Housing Stability: Not on file       Current Outpatient Medications   Medication Sig Dispense Refill    DODEX 1000 MCG/ML Solution INJECT 1 ML SUBCUTANEOUSLY EVERY 14 DAYS      TOUJEO MAX SOLOSTAR 300 UNIT/ML Solution Pen-injector 22 Units every morning.      mupirocin (BACTROBAN) 2 % Ointment APPLY TOPICALLY IN EACH NOSTRIL TWICE DAILY AS NEEDED      FLUoxetine (PROZAC) 20 MG Cap Take 20 mg by mouth every day.      melatonin 1 mg Tab Take 0.5 mg by mouth every evening.      benazepril (LOTENSIN) 40 MG tablet Take 40 mg by mouth every morning.      Empagliflozin-metFORMIN HCl ER (SYNJARDY XR) 12.5-1000 MG TABLET SR 24 HR every day.      pravastatin (PRAVACHOL) 20 MG Tab Take 20 mg by mouth every day.      indapamide (LOZOL) 2.5 MG Tab Take 2.5 mg by mouth every morning.      dilTIAZem HCl ER Beads (TAZTIA XT PO) Take 240 mg by mouth every day at 6 PM.      Cholecalciferol (D3 5000 PO) Take 5,000 Int'l Units by mouth every day.      SITagliptin Phosphate (JANUVIA PO) Take 100 mg by mouth every morning.      omeprazole (PRILOSEC) 20 MG delayed-release capsule Take 20 mg by mouth every day.       No current facility-administered medications for this visit.        ALLERGIES: Patient has no known allergies.    ROS  Constitutional: Denies fevers, Denies weight changes  Ears/Nose/Throat/Mouth: Denies nasal congestion or sore throat   Cardiovascular: Denies chest pain  Respiratory: Denies shortness of breath, Denies cough  Gastrointestinal/Hepatic: Denies nausea, vomiting  Sleep: see HPI      PHYSICAL EXAM  /72 (BP Location: Left arm, Patient Position: Sitting, BP Cuff Size: Adult)   Pulse 70   Resp 16   Ht 1.702 m (5' 7\")   Wt 72.1 kg (158 lb 14.4 oz)   SpO2 93%   BMI 24.89 kg/m²   Appearance: Well-nourished, well-developed, no acute distress  Eyes:  No scleral " icterus , EOMI  Musculoskeletal:  Grossly normal; gait and station normal; digits and nails normal  Skin:  No rashes, petechiae, cyanosis  Neurologic: without focal signs; oriented to person, time, place, and purpose; judgement intact      Medical Decision Making   Assessment and Plan    Meme Shelton is a 78 y.o.female  with type 2 diabetes mellitus, hypertension, GERD, memory impairment, and obstructive sleep apnea status post hypoglossal nerve stimulator.  Presents today to follow-up regarding management of obstructive sleep apnea.    The medical record was reviewed.    Obstructive sleep apnea  Her usage data shows regular usage.  Patient reports symptomatic benefit as does .  Suspect she may be near therapeutic levels.  She is using supplemental oxygen 2 L a minute at night.    No programming was done at today's visit.    Advised she may stay at this level or increase by 1 level to see if there is any significant difference.    PLAN:   -Order placed for PSG inspire fine-tune study  -Advised to reach out via MyChart with questions     Patients with RAD are at increased risk of cardiovascular disease including coronary artery disease, systemic arterial hypertension, pulmonary arterial hypertension, cardiac arrythmias, and stroke. The patient was advised to avoid driving a motor vehicle when drowsy.    Have advised the patient to follow up with the appropriate healthcare practitioners for all other medical problems and issues.    Return for after sleep study inspire.        Please note portions of this record was created using voice recognition software. I have made every reasonable attempt to correct obvious errors, but I expect that there are errors of grammar and possibly content I did not discover before finalizing the note.

## 2023-12-03 ENCOUNTER — SLEEP STUDY (OUTPATIENT)
Dept: SLEEP MEDICINE | Facility: MEDICAL CENTER | Age: 78
End: 2023-12-03
Attending: STUDENT IN AN ORGANIZED HEALTH CARE EDUCATION/TRAINING PROGRAM
Payer: MEDICARE

## 2023-12-03 DIAGNOSIS — G47.33 OSA (OBSTRUCTIVE SLEEP APNEA): ICD-10-CM

## 2023-12-03 DIAGNOSIS — G47.34 NOCTURNAL HYPOXIA: ICD-10-CM

## 2023-12-03 DIAGNOSIS — Z96.82 S/P INSERTION OF HYPOGLOSSAL NERVE STIMULATOR: ICD-10-CM

## 2023-12-03 PROCEDURE — 95810 POLYSOM 6/> YRS 4/> PARAM: CPT | Performed by: STUDENT IN AN ORGANIZED HEALTH CARE EDUCATION/TRAINING PROGRAM

## 2023-12-07 NOTE — PROCEDURES
Patient: SHEN CENTENO  ID: [id number] Date: 12/3/2023   MONTAGE: Standard  STUDY TYPE: Treatment inspire fine-tune  RECORDING TECHNIQUE:   After the scalp was prepared, gold plated electrodes were applied to the scalp according to the International 10-20 System. EEG (electroencephalogram) was continuously monitored from the O1-M2, O2-M1, C3-M2, C4-M1, F3-M2, and F4-M1. EOGs (electrooculograms) were monitored by electrodes placed at the left and right outer canthi. Chin EMG (electromyogram) was monitored by electrodes placed on the mentalis and sub-mentalis muscles. Nasal and oral airflow were monitored using a triple port thermocouple as well as oronasal pressure transducer. Respiratory effort was measured by inductive plethysmography technology employing abdominal and thoracic belts. Blood oxygen saturation and pulse were monitored by pulse oximetry. Heart rhythm was monitored by surface electrocardiogram. Leg EMG was studied using surface electrodes placed on left and right anterior tibialis. A microphone was used to monitor tracheal sounds and snoring. Body position was monitored and documented by technician observation.   SCORING CRITERIA:   A modification of the AASM manual for scoring of sleep and associated events was used. Obstructive apneas were scored by cessation of airflow for at least 10 seconds with continuing respiratory effort. Central apneas were scored by cessation of airflow for at least 10 seconds with no respiratory effort. Hypopneas were scored by a 30% or more reduction in airflow for at least 10 seconds accompanied by arterial oxygen desaturation of 3% or an arousal. For CMS (Medicare) patients, per AASM rule 1B, hypopneas are scored by 30% with mild reduction in airflow for at least 10 seconds accompanied by arterial saturation decreased at 4%.  Study start time was 10:13:10 PM. Diagnostic recording time was 7h 11.5m with a total sleep time of 5h 19.5m resulting in a sleep efficiency of  74.04%%. Sleep latency from the start of the study was 80 minutes and the latency from sleep to REM was 89 minutes. In total,70 arousals were scored for an arousal index of 13.1.  Respiratory:  There were a total of 8 apneas consisting of 4 obstructive apneas, 0 mixed apneas, and 4 central apneas. A total of 91 hypopneas were scored. The apnea index was 1.50 per hour and the hypopnea index was 17.09 per hour resulting in an overall AHI of 18.59. AHI during REM was 9.0 and AHI while supine was 48.55.  Oximetry:  There was a mean oxygen saturation of 91.0%. The minimum oxygen saturation in NREM was 82.0 % and in REM was 86.0%. The patient spent 18.7 minutes of TST with SaO2 <88%.  Cardiac:  The highest heart rate seen while awake was 69 BPM while the highest heart rate during sleep was 70 BPM with an average sleeping heart rate of 52 BPM.  Limb Movements:  There were a total of 177 PLMs during sleep which resulted in a PLMS index of 33.2. Of these, 23 were associated with arousals which resulted in a PLMS arousal index of 4.3.  Titration:   Inspire was used from 1.4 to 2.0V  This was a fully attended sleep study. This test was technically adequate. This patient was titrated on Inspire device (hypoglossal nerve stimulator) starting at a voltage of  1.4v. Patient was titrated up to 2.0 v.  The patient did best at 1.6v. Patient spent 28 minutes at this voltage and the AHI was 8.4 which is considered Mild obstructive sleep apnea.     Impression:  1.  Study completed with hypoglossal nerve stimulator  2.  Respiratory events worse during supine sleep with a supine AHI of 48.5 events an hour  3.  Appeared to do best on left side, with left-sided sleep 165 minutes of sleep was recorded with an overall AHI of 8.0 events an hour  4.  PLM's noted  5.  Mild nocturnal hypoxia with 18.7 minutes at or below 88% saturation, majority of which occurred during supine sleep    Recommendations:  I recommend the patient continue inspire  device at a voltage 1.6 v.  Encourage avoiding back sleeping.    Towards end of study patient did have improvement at 2.0 V as well with 26 minutes of sleep seen all of REM sleep and AHI of 11.5    Clinical correlation is required. In general patients with sleep apnea are advised to avoid alcohol, sedatives and not to operate a motor vehicle while drowsy.  Untreated sleep apnea increases the risk for cardiovascular and neurovascular disease.

## 2024-01-09 ENCOUNTER — APPOINTMENT (RX ONLY)
Dept: URBAN - METROPOLITAN AREA CLINIC 31 | Facility: CLINIC | Age: 79
Setting detail: DERMATOLOGY
End: 2024-01-09

## 2024-01-09 DIAGNOSIS — L81.4 OTHER MELANIN HYPERPIGMENTATION: ICD-10-CM

## 2024-01-09 DIAGNOSIS — L82.1 OTHER SEBORRHEIC KERATOSIS: ICD-10-CM

## 2024-01-09 DIAGNOSIS — D22 MELANOCYTIC NEVI: ICD-10-CM

## 2024-01-09 DIAGNOSIS — D18.0 HEMANGIOMA: ICD-10-CM

## 2024-01-09 DIAGNOSIS — Z71.89 OTHER SPECIFIED COUNSELING: ICD-10-CM

## 2024-01-09 PROBLEM — D22.71 MELANOCYTIC NEVI OF RIGHT LOWER LIMB, INCLUDING HIP: Status: ACTIVE | Noted: 2024-01-09

## 2024-01-09 PROBLEM — D22.72 MELANOCYTIC NEVI OF LEFT LOWER LIMB, INCLUDING HIP: Status: ACTIVE | Noted: 2024-01-09

## 2024-01-09 PROBLEM — D18.01 HEMANGIOMA OF SKIN AND SUBCUTANEOUS TISSUE: Status: ACTIVE | Noted: 2024-01-09

## 2024-01-09 PROBLEM — D22.61 MELANOCYTIC NEVI OF RIGHT UPPER LIMB, INCLUDING SHOULDER: Status: ACTIVE | Noted: 2024-01-09

## 2024-01-09 PROBLEM — D22.62 MELANOCYTIC NEVI OF LEFT UPPER LIMB, INCLUDING SHOULDER: Status: ACTIVE | Noted: 2024-01-09

## 2024-01-09 PROBLEM — D22.5 MELANOCYTIC NEVI OF TRUNK: Status: ACTIVE | Noted: 2024-01-09

## 2024-01-09 PROCEDURE — 99213 OFFICE O/P EST LOW 20 MIN: CPT

## 2024-01-09 PROCEDURE — ? ADDITIONAL NOTES

## 2024-01-09 PROCEDURE — ? COUNSELING

## 2024-01-09 ASSESSMENT — LOCATION SIMPLE DESCRIPTION DERM
LOCATION SIMPLE: RIGHT CHEEK
LOCATION SIMPLE: ABDOMEN
LOCATION SIMPLE: LEFT CHEEK
LOCATION SIMPLE: RIGHT UPPER ARM
LOCATION SIMPLE: SCALP
LOCATION SIMPLE: LEFT FOREARM
LOCATION SIMPLE: UPPER BACK
LOCATION SIMPLE: RIGHT THIGH
LOCATION SIMPLE: RIGHT CALF
LOCATION SIMPLE: RIGHT HAND
LOCATION SIMPLE: RIGHT UPPER BACK
LOCATION SIMPLE: LEFT HAND
LOCATION SIMPLE: RIGHT LOWER BACK
LOCATION SIMPLE: LEFT THIGH
LOCATION SIMPLE: RIGHT FOREARM
LOCATION SIMPLE: RIGHT SHOULDER
LOCATION SIMPLE: LEFT CALF
LOCATION SIMPLE: LEFT SHOULDER
LOCATION SIMPLE: LEFT UPPER ARM

## 2024-01-09 ASSESSMENT — LOCATION DETAILED DESCRIPTION DERM
LOCATION DETAILED: RIGHT VENTRAL DISTAL FOREARM
LOCATION DETAILED: RIGHT PROXIMAL CALF
LOCATION DETAILED: RIGHT PROXIMAL POSTERIOR UPPER ARM
LOCATION DETAILED: LEFT PROXIMAL POSTERIOR UPPER ARM
LOCATION DETAILED: RIGHT POSTERIOR SHOULDER
LOCATION DETAILED: RIGHT INFERIOR MEDIAL UPPER BACK
LOCATION DETAILED: RIGHT PROXIMAL DORSAL FOREARM
LOCATION DETAILED: EPIGASTRIC SKIN
LOCATION DETAILED: INFERIOR THORACIC SPINE
LOCATION DETAILED: LEFT VENTRAL DISTAL FOREARM
LOCATION DETAILED: PERIUMBILICAL SKIN
LOCATION DETAILED: LEFT LATERAL MALAR CHEEK
LOCATION DETAILED: LEFT PROXIMAL CALF
LOCATION DETAILED: RIGHT ULNAR DORSAL HAND
LOCATION DETAILED: RIGHT ANTERIOR DISTAL UPPER ARM
LOCATION DETAILED: RIGHT SUPERIOR MEDIAL MIDBACK
LOCATION DETAILED: LEFT ANTERIOR DISTAL THIGH
LOCATION DETAILED: LEFT ANTERIOR DISTAL UPPER ARM
LOCATION DETAILED: LEFT PROXIMAL DORSAL FOREARM
LOCATION DETAILED: LEFT POSTERIOR SHOULDER
LOCATION DETAILED: RIGHT CENTRAL MALAR CHEEK
LOCATION DETAILED: RIGHT ANTERIOR DISTAL THIGH
LOCATION DETAILED: LEFT RADIAL DORSAL HAND
LOCATION DETAILED: LEFT SUPERIOR PARIETAL SCALP

## 2024-01-09 ASSESSMENT — LOCATION ZONE DERM
LOCATION ZONE: TRUNK
LOCATION ZONE: FACE
LOCATION ZONE: HAND
LOCATION ZONE: ARM
LOCATION ZONE: SCALP
LOCATION ZONE: LEG

## 2024-01-09 NOTE — PROCEDURE: ADDITIONAL NOTES
Additional Notes: Discussed cosmetic benign destruction at separate appt. Pt counseled on potential risks of tx. Verbalizes understanding and will schedule f/u for tx. Fee reviewed.
Detail Level: Detailed
Render Risk Assessment In Note?: no

## 2024-01-23 ENCOUNTER — OFFICE VISIT (OUTPATIENT)
Dept: SLEEP MEDICINE | Facility: MEDICAL CENTER | Age: 79
End: 2024-01-23
Attending: STUDENT IN AN ORGANIZED HEALTH CARE EDUCATION/TRAINING PROGRAM
Payer: MEDICARE

## 2024-01-23 VITALS
HEIGHT: 69 IN | WEIGHT: 160 LBS | HEART RATE: 66 BPM | OXYGEN SATURATION: 94 % | BODY MASS INDEX: 23.7 KG/M2 | SYSTOLIC BLOOD PRESSURE: 104 MMHG | DIASTOLIC BLOOD PRESSURE: 42 MMHG

## 2024-01-23 DIAGNOSIS — Z96.82 S/P INSERTION OF HYPOGLOSSAL NERVE STIMULATOR: ICD-10-CM

## 2024-01-23 DIAGNOSIS — G47.33 OSA (OBSTRUCTIVE SLEEP APNEA): Primary | ICD-10-CM

## 2024-01-23 DIAGNOSIS — Z45.42 ENCOUNTER FOR ADJUSTMENT AND MANAGEMENT OF NEUROSTIMULATOR: ICD-10-CM

## 2024-01-23 PROCEDURE — 99213 OFFICE O/P EST LOW 20 MIN: CPT | Mod: 25 | Performed by: STUDENT IN AN ORGANIZED HEALTH CARE EDUCATION/TRAINING PROGRAM

## 2024-01-23 PROCEDURE — 3078F DIAST BP <80 MM HG: CPT | Performed by: STUDENT IN AN ORGANIZED HEALTH CARE EDUCATION/TRAINING PROGRAM

## 2024-01-23 PROCEDURE — 95976 ALYS SMPL CN NPGT PRGRMG: CPT | Performed by: STUDENT IN AN ORGANIZED HEALTH CARE EDUCATION/TRAINING PROGRAM

## 2024-01-23 PROCEDURE — 3074F SYST BP LT 130 MM HG: CPT | Performed by: STUDENT IN AN ORGANIZED HEALTH CARE EDUCATION/TRAINING PROGRAM

## 2024-01-23 ASSESSMENT — PATIENT HEALTH QUESTIONNAIRE - PHQ9: CLINICAL INTERPRETATION OF PHQ2 SCORE: 0

## 2024-01-23 NOTE — PROGRESS NOTES
[facility] Follow-up Visit    CC: Follow-up regarding management of obstructive sleep apnea      HPI:  Meme Shelton is a 79 y.o.female  with hypertension, type 2 diabetes mellitus, GERD, memory impairment, and obstructive sleep apnea status post hypoglossal nerve stimulator.  Presents today to follow-up regarding management of obstructive sleep apnea and to discuss recent sleep study results.    She is accompanied by her  at today's visit.    Since last visit she has undergone a in lab sleep study.  She reports night of the sleep study went well.  She does continue to use supplemental oxygen 2 L/min at night which was not used during night of study.    Her  reports she continues to do well regarding using her device.  He helps her often.  He does feel that with using inspire device and with medications from her neurologist she is doing well.  He feels that her memory is slightly improved and definitely has not worsened.    Programming and interrogation of hypoglossal nerve stimulator  Stimulation was delivered to incoming voltages.  Reviewed sleep study results and change configuration to match that seen on sleep study.    Incoming Settings  1)Start Delay: 30 min  2)Pause Time: 15 min  3)Total run time: 9 hours  4) Minimum voltage 1.1 v   5) Maximum voltage 1.8v  6) Pulse configuration (+-+)  7) Pulse Width 90 microseconds  8) Rate 33 Hz  9) Current voltage 1.7v    Outgoing Settings (Changes in Bold)   1)Start Delay: 30 min  2)Pause Time: 15 min  3)Total run time: 9 hours  4) Minimum voltage 1.1 v   5) Maximum voltage 1.8v  6) Pulse configuration (+-+)  7) Pulse Width 90 microseconds  8) Rate 33 Hz  9) Current voltage 1.6v    Patient Active Problem List    Diagnosis Date Noted    RAD (obstructive sleep apnea) 01/18/2023    Nocturnal hypoxia 01/18/2023       Past Medical History:   Diagnosis Date    Arthritis 05/02/2023    Hands    Awareness under anesthesia     Cataract     IOL OU     Dependence on nocturnal oxygen therapy 2023    2L    Diabetes (HCC)     High cholesterol 2023    on medication    Hypertension 2023    on medication    Non-Hodgkin lymphoma in remission (HCC)     Psychiatric problem 2023    short term memory loss, slight depression    Short-term memory loss 2023    Sleep apnea 2023    2L HS    Urinary incontinence 2023    stress incontinence        Past Surgical History:   Procedure Laterality Date    [place] OPEN IMPLTJ HPGLSL NRV NSTIM RA PG&RESPIR SENSOR Right 6/15/2023    Procedure: OPEN IMPLATATION OF HYPOGLOSSAL NERVE NEUROSTIMULATOR ARRAY PULSE GENERATOR AND DISTAL RESPIRATORY SENSOR ELECTRODE OR ELECTRODE ARRAY. Nerve stimulation monitoring;  Surgeon: Gorge Reynoso M.D.;  Location: SURGERY SAME DAY [facility];  Service: Ent    [place] DISE DYN EVAL SLEEP DISORDERED BREATHING FLX DX  2023    Procedure: DRUG INDUCED SLEEP ENDOSCOPY;  Surgeon: Gorge Reynoso M.D.;  Location: SURGERY SAME DAY [facility];  Service: Ent    CATARACT EXTRACTION WITH IOL Bilateral     CHOLECYSTECTOMY      HYSTERECTOMY LAPAROSCOPY         Family History   Problem Relation Age of Onset    Cancer Mother     Cancer Father        Social History     Socioeconomic History    Marital status:      Spouse name: Not on file    Number of children: Not on file    Years of education: Not on file    Highest education level: Not on file   Occupational History    Not on file   Tobacco Use    Smoking status: Former     Current packs/day: 0.00     Average packs/day: 1 pack/day for 13.0 years (13.0 ttl pk-yrs)     Types: Cigarettes     Start date:      Quit date:      Years since quittin.0     Passive exposure: Past    Smokeless tobacco: Never    Tobacco comments:     Quit smoking 40 years ago   Vaping Use    Vaping Use: Never used   Substance and Sexual Activity    Alcohol use: Yes     Alcohol/week: 0.6 oz     Types: 1 Glasses of wine per week    Drug  use: Never    Sexual activity: Not on file   Other Topics Concern    Not on file   Social History Narrative    Not on file     Social Determinants of Health     Financial Resource Strain: Not on file   Food Insecurity: Not on file   Transportation Needs: Not on file   Physical Activity: Not on file   Stress: Not on file   Social Connections: Not on file   Intimate Partner Violence: Not on file   Housing Stability: Not on file       Current Outpatient Medications   Medication Sig Dispense Refill    TOUJEO MAX SOLOSTAR 300 UNIT/ML Solution Pen-injector 22 Units every morning.      FLUoxetine (PROZAC) 20 MG Cap Take 20 mg by mouth every day.      melatonin 1 mg Tab Take 0.5 mg by mouth every evening.      benazepril (LOTENSIN) 40 MG tablet Take 40 mg by mouth every morning.      Empagliflozin-metFORMIN HCl ER (SYNJARDY XR) 12.5-1000 MG TABLET SR 24 HR every day.      pravastatin (PRAVACHOL) 20 MG Tab Take 20 mg by mouth every day.      indapamide (LOZOL) 2.5 MG Tab Take 2.5 mg by mouth every morning.      dilTIAZem HCl ER Beads (TAZTIA XT PO) Take 240 mg by mouth every day at 6 PM.      Cholecalciferol (D3 5000 PO) Take 5,000 Int'l Units by mouth every day.      SITagliptin Phosphate (JANUVIA PO) Take 100 mg by mouth every morning.      omeprazole (PRILOSEC) 20 MG delayed-release capsule Take 20 mg by mouth every day.      DODEX 1000 MCG/ML Solution INJECT 1 ML SUBCUTANEOUSLY EVERY 14 DAYS      mupirocin (BACTROBAN) 2 % Ointment APPLY TOPICALLY IN EACH NOSTRIL TWICE DAILY AS NEEDED       No current facility-administered medications for this visit.        ALLERGIES: Patient has no known allergies.    ROS  Constitutional: Denies fevers, Denies weight changes  Ears/Nose/Throat/Mouth: Denies nasal congestion or sore throat   Cardiovascular: Denies chest pain  Respiratory: Denies shortness of breath, Denies cough  Gastrointestinal/Hepatic: Denies nausea, vomiting  Sleep: see HPI      PHYSICAL EXAM  /42 (BP Location:  "Left arm, Patient Position: Sitting, BP Cuff Size: Adult)   Pulse 66   Ht 1.753 m (5' 9\")   Wt 72.6 kg (160 lb)   SpO2 94%   BMI 23.63 kg/m²   Appearance: Well-nourished, well-developed, no acute distress  Eyes:  No scleral icterus , EOMI  Musculoskeletal:  Grossly normal; gait and station normal; digits and nails normal  Skin:  No rashes, petechiae, cyanosis  Neurologic: without focal signs; oriented to person, time, place, and purpose; judgement intact      Medical Decision Making   Assessment and Plan  Meme Shelton is a 79 y.o.female  with hypertension, type 2 diabetes mellitus, GERD, memory impairment, and obstructive sleep apnea status post hypoglossal nerve stimulator.  Presents today to follow-up regarding management of obstructive sleep apnea and to discuss recent sleep study results.    The medical record was reviewed.    Obstructive sleep apnea  Reviewed recent study results with patient showing a reduction in overall AHI from baseline.  In addition oxygen saturations improved from baseline with use of inspire device during study.  Advised that the inspire device can have a difficult time controlling sleep apnea in the supine position.  When she was nonsupine her AHI improved below 10.  Discussed positional therapy and positional therapy devices such as the sleep noodle and slumber bump.  Suspect her sleep apnea is better controlled at home and oxygen saturations are improved given that she is using supplemental oxygen 2 L/min.    Encouraged to avoid back sleeping and start using a positional device.  Recommended she continue to use her hypoglossal nerve stimulator nightly and may stay at her current voltage setting.    PLAN:   -Simple programming completed at today's visit lowering voltage  -Start positional therapy  -May continue supplemental oxygen 2 L/min  -Advised to reach out via MyChart with questions     Patients with RAD are at increased risk of cardiovascular disease including coronary " artery disease, systemic arterial hypertension, pulmonary arterial hypertension, cardiac arrythmias, and stroke. The patient was advised to avoid driving a motor vehicle when drowsy.    Have advised the patient to follow up with the appropriate healthcare practitioners for all other medical problems and issues.    Return in about 4 months (around 5/23/2024).      Please note portions of this record was created using voice recognition software. I have made every reasonable attempt to correct obvious errors, but I expect that there are errors of grammar and possibly content I did not discover before finalizing the note.

## 2024-05-21 ENCOUNTER — OFFICE VISIT (OUTPATIENT)
Dept: SLEEP MEDICINE | Facility: MEDICAL CENTER | Age: 79
End: 2024-05-21
Attending: STUDENT IN AN ORGANIZED HEALTH CARE EDUCATION/TRAINING PROGRAM
Payer: MEDICARE

## 2024-05-21 VITALS
DIASTOLIC BLOOD PRESSURE: 60 MMHG | HEIGHT: 69 IN | BODY MASS INDEX: 23.11 KG/M2 | RESPIRATION RATE: 16 BRPM | WEIGHT: 156 LBS | HEART RATE: 65 BPM | OXYGEN SATURATION: 95 % | SYSTOLIC BLOOD PRESSURE: 116 MMHG

## 2024-05-21 DIAGNOSIS — G47.33 OSA (OBSTRUCTIVE SLEEP APNEA): ICD-10-CM

## 2024-05-21 DIAGNOSIS — Z96.82 S/P INSERTION OF HYPOGLOSSAL NERVE STIMULATOR: ICD-10-CM

## 2024-05-21 PROCEDURE — 95970 ALYS NPGT W/O PRGRMG: CPT | Performed by: STUDENT IN AN ORGANIZED HEALTH CARE EDUCATION/TRAINING PROGRAM

## 2024-05-21 PROCEDURE — 3078F DIAST BP <80 MM HG: CPT | Performed by: STUDENT IN AN ORGANIZED HEALTH CARE EDUCATION/TRAINING PROGRAM

## 2024-05-21 PROCEDURE — 99213 OFFICE O/P EST LOW 20 MIN: CPT | Performed by: STUDENT IN AN ORGANIZED HEALTH CARE EDUCATION/TRAINING PROGRAM

## 2024-05-21 PROCEDURE — 3074F SYST BP LT 130 MM HG: CPT | Performed by: STUDENT IN AN ORGANIZED HEALTH CARE EDUCATION/TRAINING PROGRAM

## 2024-05-21 RX ORDER — MEMANTINE HYDROCHLORIDE 10 MG/1
10 TABLET ORAL 2 TIMES DAILY
COMMUNITY
Start: 2024-01-08

## 2024-05-21 ASSESSMENT — FIBROSIS 4 INDEX: FIB4 SCORE: 1.03

## 2024-05-21 NOTE — PROGRESS NOTES
[facility] Follow-up Visit    CC: Follow-up for management of obstructive sleep apnea      HPI:  Meme Shelton is a 79 y.o.female  with GERD, hypertension, type 2 diabetes mellitus, memory impairment, and obstructive sleep apnea status post hypoglossal nerve stimulator.  Presents today to follow-up regarding management of obstructive sleep apnea.    She is accompanied by her  at today's visit.    She continues to use her hypoglossal nerve stimulator nightly.  She is using it with supplemental oxygen 2 L/min.  In addition she is making sure not to roll onto her back at night.  They do have a positional device at home but she does not always use it as she feels with pillows and her position she generally stays on her side.  Her  reports that if he does check on her in the night she is often on her side and rarely on her back.  Overall she finds the stimulation comfortable.  Has no acute complaints regarding hypoglossal nerve stimulator.  She does feel that there is some benefit to using it.    Interrogation of hypoglossal nerve stimulator  Stimulation was delivered to incoming settings.  Tongue protrusion was noted.  Patient found stimulation comfortable    Incoming settings  1)Start Delay: 30 min  2)Pause Time: 15 min  3)Total run time: 9 hours  4) Minimum voltage 1.4 v   5) Maximum voltage 1.8v  6) Pulse configuration (+-+)  7) Pulse Width 90 microseconds  8) Rate 33 Hz  9) Current voltage 1.6v      Sleep History  12/3/2023 PSG inspire fine-tune study showed an overall AHI of 18.6 events an hour.  Patient spent 165 minutes of sleep on her left side with an AHI of 8 at 1.6 V.    Patient Active Problem List    Diagnosis Date Noted    RAD (obstructive sleep apnea) 01/18/2023    Nocturnal hypoxia 01/18/2023       Past Medical History:   Diagnosis Date    Arthritis 05/02/2023    Hands    Awareness under anesthesia     Cataract     IOL OU    Dependence on nocturnal oxygen therapy 05/02/2023    2L     Diabetes (HCC)     High cholesterol 2023    on medication    Hypertension 2023    on medication    Non-Hodgkin lymphoma in remission (HCC)     Psychiatric problem 2023    short term memory loss, slight depression    Short-term memory loss 2023    Sleep apnea 2023    2L HS    Urinary incontinence 2023    stress incontinence        Past Surgical History:   Procedure Laterality Date    OH OPEN IMPLTJ HPGLSL NRV NSTIM RA PG&RESPIR SENSOR Right 6/15/2023    Procedure: OPEN IMPLATATION OF HYPOGLOSSAL NERVE NEUROSTIMULATOR ARRAY PULSE GENERATOR AND DISTAL RESPIRATORY SENSOR ELECTRODE OR ELECTRODE ARRAY. Nerve stimulation monitoring;  Surgeon: Gorge Reynoso M.D.;  Location: SURGERY SAME DAY [facility];  Service: Ent    OH DISE DYN EVAL SLEEP DISORDERED BREATHING FLX DX  2023    Procedure: DRUG INDUCED SLEEP ENDOSCOPY;  Surgeon: Gorge Reynoso M.D.;  Location: SURGERY SAME DAY [facility];  Service: Ent    CATARACT EXTRACTION WITH IOL Bilateral     CHOLECYSTECTOMY      HYSTERECTOMY LAPAROSCOPY         Family History   Problem Relation Age of Onset    Cancer Mother     Cancer Father        Social History     Socioeconomic History    Marital status:      Spouse name: Not on file    Number of children: Not on file    Years of education: Not on file    Highest education level: Not on file   Occupational History    Not on file   Tobacco Use    Smoking status: Former     Current packs/day: 0.00     Average packs/day: 1 pack/day for 13.0 years (13.0 ttl pk-yrs)     Types: Cigarettes     Start date:      Quit date:      Years since quittin.4     Passive exposure: Past    Smokeless tobacco: Never    Tobacco comments:     Quit smoking 40 years ago   Vaping Use    Vaping status: Never Used   Substance and Sexual Activity    Alcohol use: Yes     Alcohol/week: 0.6 oz     Types: 1 Glasses of wine per week    Drug use: Never    Sexual activity: Not on file   Other  Topics Concern    Not on file   Social History Narrative    Not on file     Social Determinants of Health     Financial Resource Strain: Not on file   Food Insecurity: Not on file   Transportation Needs: Not on file   Physical Activity: Not on file   Stress: Not on file   Social Connections: Not on file   Intimate Partner Violence: Not on file   Housing Stability: Not on file       Current Outpatient Medications   Medication Sig Dispense Refill    memantine (NAMENDA) 10 MG Tab Take 10 mg by mouth 2 times a day.      DODEX 1000 MCG/ML Solution INJECT 1 ML SUBCUTANEOUSLY EVERY 14 DAYS      TOUJEO MAX SOLOSTAR 300 UNIT/ML Solution Pen-injector 22 Units every morning.      mupirocin (BACTROBAN) 2 % Ointment APPLY TOPICALLY IN EACH NOSTRIL TWICE DAILY AS NEEDED      FLUoxetine (PROZAC) 20 MG Cap Take 20 mg by mouth every day.      melatonin 1 mg Tab Take 0.5 mg by mouth every evening.      benazepril (LOTENSIN) 40 MG tablet Take 40 mg by mouth every morning.      Empagliflozin-metFORMIN HCl ER (SYNJARDY XR) 12.5-1000 MG TABLET SR 24 HR every day.      pravastatin (PRAVACHOL) 20 MG Tab Take 20 mg by mouth every day.      indapamide (LOZOL) 2.5 MG Tab Take 2.5 mg by mouth every morning.      dilTIAZem HCl ER Beads (TAZTIA XT PO) Take 240 mg by mouth every day at 6 PM.      Cholecalciferol (D3 5000 PO) Take 5,000 Int'l Units by mouth every day.      SITagliptin Phosphate (JANUVIA PO) Take 100 mg by mouth every morning.      omeprazole (PRILOSEC) 20 MG delayed-release capsule Take 20 mg by mouth every day.       No current facility-administered medications for this visit.        ALLERGIES: Patient has no known allergies.    ROS  Constitutional: Denies fevers, Denies weight changes  Ears/Nose/Throat/Mouth: Denies nasal congestion or sore throat   Cardiovascular: Denies chest pain  Respiratory: Denies shortness of breath, Denies cough  Gastrointestinal/Hepatic: Denies nausea, vomiting  Sleep: see HPI      PHYSICAL EXAM  BP  "116/60 (BP Location: Left arm, Patient Position: Sitting, BP Cuff Size: Adult)   Pulse 65   Resp 16   Ht 1.753 m (5' 9\")   Wt 70.8 kg (156 lb)   SpO2 95%   BMI 23.04 kg/m²   Appearance: Well-nourished, well-developed, no acute distress  Eyes:  No scleral icterus , EOMI  Musculoskeletal:  Grossly normal; gait and station normal; digits and nails normal  Skin:  No rashes, petechiae, cyanosis  Neurologic: without focal signs; oriented to person, time, place, and purpose; judgement intact      Medical Decision Making   Assessment and Plan  Meme Shelton is a 79 y.o.female  with GERD, hypertension, type 2 diabetes mellitus, memory impairment, and obstructive sleep apnea status post hypoglossal nerve stimulator.  Presents today to follow-up regarding management of obstructive sleep apnea.    The medical record was reviewed.    Obstructive sleep apnea  Usage data from her device shows regular usage.  Patient is noticing benefit and uses her device nightly.  Discussed importance of continue to avoid back sleeping.  Advised that she should continue to use her inspire device nightly.  She may continue to use her supplemental oxygen 2 L/min.    PLAN:   -Continue hypoglossal nerve stimulator  -Advised to reach out via MyChart with questions     Patients with RAD are at increased risk of cardiovascular disease including coronary artery disease, systemic arterial hypertension, pulmonary arterial hypertension, cardiac arrythmias, and stroke. The patient was advised to avoid driving a motor vehicle when drowsy.    Have advised the patient to follow up with the appropriate healthcare practitioners for all other medical problems and issues.    Return in about 1 year (around 5/21/2025).      Please note portions of this record was created using voice recognition software. I have made every reasonable attempt to correct obvious errors, but I expect that there are errors of grammar and possibly content I did not discover before " finalizing the note.

## 2025-01-20 ENCOUNTER — APPOINTMENT (OUTPATIENT)
Dept: URBAN - METROPOLITAN AREA CLINIC 31 | Facility: CLINIC | Age: 80
Setting detail: DERMATOLOGY
End: 2025-01-20

## 2025-01-20 DIAGNOSIS — L82.1 OTHER SEBORRHEIC KERATOSIS: ICD-10-CM

## 2025-01-20 DIAGNOSIS — D22 MELANOCYTIC NEVI: ICD-10-CM

## 2025-01-20 DIAGNOSIS — Z71.89 OTHER SPECIFIED COUNSELING: ICD-10-CM

## 2025-01-20 DIAGNOSIS — D18.0 HEMANGIOMA: ICD-10-CM

## 2025-01-20 DIAGNOSIS — L81.4 OTHER MELANIN HYPERPIGMENTATION: ICD-10-CM

## 2025-01-20 PROBLEM — D22.5 MELANOCYTIC NEVI OF TRUNK: Status: ACTIVE | Noted: 2025-01-20

## 2025-01-20 PROBLEM — D22.71 MELANOCYTIC NEVI OF RIGHT LOWER LIMB, INCLUDING HIP: Status: ACTIVE | Noted: 2025-01-20

## 2025-01-20 PROBLEM — D22.72 MELANOCYTIC NEVI OF LEFT LOWER LIMB, INCLUDING HIP: Status: ACTIVE | Noted: 2025-01-20

## 2025-01-20 PROBLEM — D22.61 MELANOCYTIC NEVI OF RIGHT UPPER LIMB, INCLUDING SHOULDER: Status: ACTIVE | Noted: 2025-01-20

## 2025-01-20 PROBLEM — D18.01 HEMANGIOMA OF SKIN AND SUBCUTANEOUS TISSUE: Status: ACTIVE | Noted: 2025-01-20

## 2025-01-20 PROBLEM — D22.62 MELANOCYTIC NEVI OF LEFT UPPER LIMB, INCLUDING SHOULDER: Status: ACTIVE | Noted: 2025-01-20

## 2025-01-20 PROCEDURE — 99213 OFFICE O/P EST LOW 20 MIN: CPT

## 2025-01-20 PROCEDURE — ? COUNSELING

## 2025-01-20 ASSESSMENT — LOCATION DETAILED DESCRIPTION DERM
LOCATION DETAILED: LEFT PROXIMAL POSTERIOR UPPER ARM
LOCATION DETAILED: RIGHT PROXIMAL POSTERIOR UPPER ARM
LOCATION DETAILED: EPIGASTRIC SKIN
LOCATION DETAILED: RIGHT ANTERIOR DISTAL THIGH
LOCATION DETAILED: LEFT LATERAL MALAR CHEEK
LOCATION DETAILED: RIGHT VENTRAL DISTAL FOREARM
LOCATION DETAILED: RIGHT SUPERIOR MEDIAL MIDBACK
LOCATION DETAILED: RIGHT PROXIMAL CALF
LOCATION DETAILED: RIGHT ANTERIOR PROXIMAL UPPER ARM
LOCATION DETAILED: LEFT POSTERIOR SHOULDER
LOCATION DETAILED: LEFT VENTRAL DISTAL FOREARM
LOCATION DETAILED: INFERIOR THORACIC SPINE
LOCATION DETAILED: LEFT CENTRAL FRONTAL SCALP
LOCATION DETAILED: RIGHT CENTRAL MALAR CHEEK
LOCATION DETAILED: LEFT PROXIMAL CALF
LOCATION DETAILED: RIGHT INFERIOR MEDIAL UPPER BACK
LOCATION DETAILED: LEFT RADIAL DORSAL HAND
LOCATION DETAILED: LEFT ANTERIOR DISTAL THIGH
LOCATION DETAILED: RIGHT ANTERIOR DISTAL UPPER ARM
LOCATION DETAILED: RIGHT PROXIMAL DORSAL FOREARM
LOCATION DETAILED: LEFT ANTERIOR DISTAL UPPER ARM
LOCATION DETAILED: PERIUMBILICAL SKIN
LOCATION DETAILED: LEFT SUPRAPUBIC SKIN
LOCATION DETAILED: LEFT PROXIMAL DORSAL FOREARM
LOCATION DETAILED: RIGHT POSTERIOR SHOULDER
LOCATION DETAILED: RIGHT ULNAR DORSAL HAND

## 2025-01-20 ASSESSMENT — LOCATION SIMPLE DESCRIPTION DERM
LOCATION SIMPLE: RIGHT THIGH
LOCATION SIMPLE: LEFT UPPER ARM
LOCATION SIMPLE: RIGHT CHEEK
LOCATION SIMPLE: RIGHT UPPER ARM
LOCATION SIMPLE: RIGHT CALF
LOCATION SIMPLE: LEFT HAND
LOCATION SIMPLE: RIGHT HAND
LOCATION SIMPLE: RIGHT UPPER BACK
LOCATION SIMPLE: LEFT CALF
LOCATION SIMPLE: LEFT SCALP
LOCATION SIMPLE: RIGHT FOREARM
LOCATION SIMPLE: LEFT THIGH
LOCATION SIMPLE: UPPER BACK
LOCATION SIMPLE: ABDOMEN
LOCATION SIMPLE: GROIN
LOCATION SIMPLE: RIGHT LOWER BACK
LOCATION SIMPLE: LEFT FOREARM
LOCATION SIMPLE: LEFT SHOULDER
LOCATION SIMPLE: LEFT CHEEK
LOCATION SIMPLE: RIGHT SHOULDER

## 2025-01-20 ASSESSMENT — LOCATION ZONE DERM
LOCATION ZONE: ARM
LOCATION ZONE: LEG
LOCATION ZONE: SCALP
LOCATION ZONE: TRUNK
LOCATION ZONE: FACE
LOCATION ZONE: HAND

## (undated) DEVICE — TUBING CLEARLINK DUO-VENT - C-FLO (48EA/CA)

## (undated) DEVICE — CANNULA O2 COMFORT SOFT EAR ADULT 7 FT TUBING (50/CA)

## (undated) DEVICE — SODIUM CHL IRRIGATION 0.9% 1000ML (12EA/CA)

## (undated) DEVICE — BOVIE BLADE COATED &INSULATED - 25/PK

## (undated) DEVICE — COVER CIV-FLEX TRANSDUCER - (24/BX)

## (undated) DEVICE — DRAPE STRLE REG TOWEL 18X24 - (10/BX 4BX/CA)"

## (undated) DEVICE — TUBE CONNECTING SUCTION - CLEAR PLASTIC STERILE 72 IN (50EA/CA)

## (undated) DEVICE — TUBE E-T HI-LO CUFF 6.5MM (10EA/BX)

## (undated) DEVICE — RHINOLARYNGOSCOPE ENDOSCOPIC BENDING ANGLE ASCOPE 4 L300MM (5EA/CA)

## (undated) DEVICE — BLADE SURGICAL #15 - (50/BX 3BX/CA)

## (undated) DEVICE — GOWN WARMING STANDARD FLEX - (30/CA)

## (undated) DEVICE — ANTI-FOG SOLUTION - 60BTL/CA

## (undated) DEVICE — LACTATED RINGERS INJ 1000 ML - (14EA/CA 60CA/PF)

## (undated) DEVICE — CORD ELECTROSURGICAL BIPOLAR FORCEPS VALLEYLAP 12 (50EA/CA)"

## (undated) DEVICE — SPONGE PEANUT - (5/PK 50PK/CA)

## (undated) DEVICE — SENSOR OXIMETER ADULT SPO2 RD SET (20EA/BX)

## (undated) DEVICE — DERMABOND ADVANCED - (12EA/BX)

## (undated) DEVICE — PROBE KARTUSH BIPOL STIMULATI (5EA/PK)

## (undated) DEVICE — GLOVE BIOGEL SZ 7.5 SURGICAL PF LTX - (50PR/BX 4BX/CA)

## (undated) DEVICE — SLEEVE VASO CALF MED - (10PR/CA)

## (undated) DEVICE — WATER IRRIGATION STERILE 1000ML (12EA/CA)

## (undated) DEVICE — PACK ENT OR - (2EA/CA)

## (undated) DEVICE — VESSELOOP MAXI BLUE STERILE- SURG-I-LOOP (10EA/BX)

## (undated) DEVICE — SET LEADWIRE 5 LEAD BEDSIDE DISPOSABLE ECG (1SET OF 5/EA)

## (undated) DEVICE — GLOVE BIOGEL PI INDICATOR SZ 8.0 SURGICAL PF LF -(50/BX 4BX/CA)

## (undated) DEVICE — GOWN SURGEONS X-LARGE - DISP. (30/CA)

## (undated) DEVICE — BRONCHOSCOPE 4 SLIM 3.8/1.2 (5EA/CA)

## (undated) DEVICE — GLOVE BIOGEL INDICATOR SZ 7SURGICAL PF LTX - (50/BX 4BX/CA)

## (undated) DEVICE — SUCTION INSTRUMENT YANKAUER BULBOUS TIP W/O VENT (50EA/CA)

## (undated) DEVICE — TRAY SKIN SCRUB PVP WET (20EA/CA) PART #DYND70356 DISCONTINUED

## (undated) DEVICE — KIT  I.V. START (100EA/CA)

## (undated) DEVICE — GLOVE BIOGEL SZ 6.5 SURGICAL PF LTX (50PR/BX 4BX/CA)

## (undated) DEVICE — DRAPE MICROSCOPE ARMATEC 120IN X 46IN (10EA/CA)

## (undated) DEVICE — CANISTER SUCTION RIGID RED 1500CC (40EA/CA)

## (undated) DEVICE — DEPRESSOR TONGUE ADLT STERILE - 6 IN (100EA/BX)

## (undated) DEVICE — GLOVE BIOGEL PI INDICATOR SZ 7.5 SURGICAL PF LF -(50/BX 4BX/CA)

## (undated) DEVICE — CANISTER SUCTION 3000ML MECHANICAL FILTER AUTO SHUTOFF MEDI-VAC NONSTERILE LF DISP  (40EA/CA)

## (undated) DEVICE — ELECTRODE EMG PAIRED MEDTRONIC XOMED PRASS 18MM (5EA/PK)

## (undated) DEVICE — MASK OXYGEN VNYL ADLT MED CONC WITH 7 FOOT TUBING  - (50EA/CA)

## (undated) DEVICE — TOWEL STOP TIMEOUT SAFETY FLAG (40EA/CA)

## (undated) DEVICE — REMOTE PATIENT SLEEP INSPIRE (1EA)

## (undated) DEVICE — PEN SKIN MARKER W/RULER - (50EA/BX)

## (undated) DEVICE — PASSER CATHETER 38 CM

## (undated) DEVICE — DRAPE IOBAN II INCISE 23X17 - (10EA/BX 4BX/CA)

## (undated) DEVICE — GOWN SURGICAL XX-LARGE - (28EA/CA) SIRUS NON REINFORCED

## (undated) DEVICE — SUTURE GENERAL